# Patient Record
Sex: FEMALE | Race: WHITE | Employment: UNEMPLOYED | URBAN - METROPOLITAN AREA
[De-identification: names, ages, dates, MRNs, and addresses within clinical notes are randomized per-mention and may not be internally consistent; named-entity substitution may affect disease eponyms.]

---

## 2017-03-27 ENCOUNTER — APPOINTMENT (OUTPATIENT)
Dept: GENERAL RADIOLOGY | Age: 67
End: 2017-03-27
Payer: MEDICARE

## 2017-03-27 ENCOUNTER — APPOINTMENT (OUTPATIENT)
Dept: CT IMAGING | Age: 67
End: 2017-03-27
Payer: MEDICARE

## 2017-03-27 ENCOUNTER — APPOINTMENT (OUTPATIENT)
Dept: MRI IMAGING | Age: 67
End: 2017-03-27
Payer: MEDICARE

## 2017-03-27 ENCOUNTER — HOSPITAL ENCOUNTER (OUTPATIENT)
Age: 67
Setting detail: OBSERVATION
Discharge: HOME | End: 2017-03-29
Attending: EMERGENCY MEDICINE | Admitting: INTERNAL MEDICINE
Payer: MEDICARE

## 2017-03-27 DIAGNOSIS — G45.9 TRANSIENT CEREBRAL ISCHEMIA, UNSPECIFIED TYPE: Primary | ICD-10-CM

## 2017-03-27 LAB
ALBUMIN SERPL-MCNC: 4 G/DL (ref 3.9–4.9)
ALP BLD-CCNC: 66 U/L (ref 40–130)
ALT SERPL-CCNC: 15 U/L (ref 0–33)
AMPHETAMINE SCREEN, URINE: NORMAL
ANION GAP SERPL CALCULATED.3IONS-SCNC: 8 MEQ/L (ref 7–13)
APTT: 24.9 SEC (ref 21.6–35.4)
AST SERPL-CCNC: 16 U/L (ref 0–35)
BARBITURATE SCREEN URINE: NORMAL
BASOPHILS ABSOLUTE: 0.1 K/UL (ref 0–0.2)
BASOPHILS RELATIVE PERCENT: 1 %
BENZODIAZEPINE SCREEN, URINE: NORMAL
BILIRUB SERPL-MCNC: 0.8 MG/DL (ref 0–1.2)
BILIRUBIN URINE: NEGATIVE
BLOOD, URINE: NEGATIVE
BUN BLDV-MCNC: 13 MG/DL (ref 8–23)
CALCIUM SERPL-MCNC: 9.3 MG/DL (ref 8.6–10.2)
CANNABINOID SCREEN URINE: NORMAL
CHLORIDE BLD-SCNC: 102 MEQ/L (ref 98–107)
CLARITY: CLEAR
CO2: 26 MEQ/L (ref 22–29)
COCAINE METABOLITE SCREEN URINE: NORMAL
COLOR: YELLOW
CREAT SERPL-MCNC: 0.72 MG/DL (ref 0.5–0.9)
EOSINOPHILS ABSOLUTE: 0.5 K/UL (ref 0–0.7)
EOSINOPHILS RELATIVE PERCENT: 6.1 %
ETHANOL PERCENT: NORMAL G/DL
ETHANOL: <10 MG/DL (ref 0–0.08)
GFR AFRICAN AMERICAN: >60
GFR NON-AFRICAN AMERICAN: >60
GLOBULIN: 2 G/DL (ref 2.3–3.5)
GLUCOSE BLD-MCNC: 99 MG/DL (ref 74–109)
GLUCOSE URINE: NEGATIVE MG/DL
HCT VFR BLD CALC: 45 % (ref 37–47)
HEMOGLOBIN: 15.1 G/DL (ref 12–16)
INR BLD: 0.9
KETONES, URINE: NEGATIVE MG/DL
LEUKOCYTE ESTERASE, URINE: NEGATIVE
LYMPHOCYTES ABSOLUTE: 2 K/UL (ref 1–4.8)
LYMPHOCYTES RELATIVE PERCENT: 24.8 %
Lab: NORMAL
MCH RBC QN AUTO: 29.5 PG (ref 27–31.3)
MCHC RBC AUTO-ENTMCNC: 33.5 % (ref 33–37)
MCV RBC AUTO: 87.9 FL (ref 82–100)
MONOCYTES ABSOLUTE: 0.7 K/UL (ref 0.2–0.8)
MONOCYTES RELATIVE PERCENT: 8.8 %
NEUTROPHILS ABSOLUTE: 4.8 K/UL (ref 1.4–6.5)
NEUTROPHILS RELATIVE PERCENT: 59.3 %
NITRITE, URINE: NEGATIVE
OPIATE SCREEN URINE: NORMAL
PDW BLD-RTO: 14.1 % (ref 11.5–14.5)
PH UA: 7 (ref 5–9)
PHENCYCLIDINE SCREEN URINE: NORMAL
PLATELET # BLD: 184 K/UL (ref 130–400)
POTASSIUM SERPL-SCNC: 4.4 MEQ/L (ref 3.5–5.1)
PRO-BNP: 33 PG/ML
PROTEIN UA: NEGATIVE MG/DL
PROTHROMBIN TIME: 10.1 SEC (ref 8.1–13.7)
RBC # BLD: 5.12 M/UL (ref 4.2–5.4)
SODIUM BLD-SCNC: 136 MEQ/L (ref 132–144)
SPECIFIC GRAVITY UA: 1 (ref 1–1.03)
TOTAL PROTEIN: 6 G/DL (ref 6.4–8.1)
TROPONIN: <0.01 NG/ML (ref 0–0.01)
UROBILINOGEN, URINE: 0.2 E.U./DL
WBC # BLD: 8 K/UL (ref 4.8–10.8)

## 2017-03-27 PROCEDURE — 70450 CT HEAD/BRAIN W/O DYE: CPT

## 2017-03-27 PROCEDURE — 96372 THER/PROPH/DIAG INJ SC/IM: CPT

## 2017-03-27 PROCEDURE — 2580000003 HC RX 258: Performed by: PHYSICIAN ASSISTANT

## 2017-03-27 PROCEDURE — 85610 PROTHROMBIN TIME: CPT

## 2017-03-27 PROCEDURE — 71010 XR CHEST PORTABLE: CPT

## 2017-03-27 PROCEDURE — 85025 COMPLETE CBC W/AUTO DIFF WBC: CPT

## 2017-03-27 PROCEDURE — 80053 COMPREHEN METABOLIC PANEL: CPT

## 2017-03-27 PROCEDURE — 80307 DRUG TEST PRSMV CHEM ANLYZR: CPT

## 2017-03-27 PROCEDURE — 6370000000 HC RX 637 (ALT 250 FOR IP): Performed by: PHYSICIAN ASSISTANT

## 2017-03-27 PROCEDURE — 70544 MR ANGIOGRAPHY HEAD W/O DYE: CPT

## 2017-03-27 PROCEDURE — 36415 COLL VENOUS BLD VENIPUNCTURE: CPT

## 2017-03-27 PROCEDURE — 70551 MRI BRAIN STEM W/O DYE: CPT

## 2017-03-27 PROCEDURE — 84484 ASSAY OF TROPONIN QUANT: CPT

## 2017-03-27 PROCEDURE — 83880 ASSAY OF NATRIURETIC PEPTIDE: CPT

## 2017-03-27 PROCEDURE — 81003 URINALYSIS AUTO W/O SCOPE: CPT

## 2017-03-27 PROCEDURE — 6370000000 HC RX 637 (ALT 250 FOR IP): Performed by: EMERGENCY MEDICINE

## 2017-03-27 PROCEDURE — G0378 HOSPITAL OBSERVATION PER HR: HCPCS

## 2017-03-27 PROCEDURE — 85730 THROMBOPLASTIN TIME PARTIAL: CPT

## 2017-03-27 PROCEDURE — G0480 DRUG TEST DEF 1-7 CLASSES: HCPCS

## 2017-03-27 PROCEDURE — 99285 EMERGENCY DEPT VISIT HI MDM: CPT

## 2017-03-27 PROCEDURE — 93005 ELECTROCARDIOGRAM TRACING: CPT

## 2017-03-27 PROCEDURE — 6360000002 HC RX W HCPCS: Performed by: PHYSICIAN ASSISTANT

## 2017-03-27 RX ORDER — ONDANSETRON 2 MG/ML
4 INJECTION INTRAMUSCULAR; INTRAVENOUS EVERY 6 HOURS PRN
Status: DISCONTINUED | OUTPATIENT
Start: 2017-03-27 | End: 2017-03-29 | Stop reason: HOSPADM

## 2017-03-27 RX ORDER — ATORVASTATIN CALCIUM 20 MG/1
20 TABLET, FILM COATED ORAL NIGHTLY
Status: DISCONTINUED | OUTPATIENT
Start: 2017-03-27 | End: 2017-03-29 | Stop reason: HOSPADM

## 2017-03-27 RX ORDER — ASPIRIN 325 MG
325 TABLET ORAL ONCE
Status: COMPLETED | OUTPATIENT
Start: 2017-03-27 | End: 2017-03-27

## 2017-03-27 RX ORDER — LORAZEPAM 2 MG/ML
2 INJECTION INTRAMUSCULAR ONCE
Status: DISCONTINUED | OUTPATIENT
Start: 2017-03-27 | End: 2017-03-29 | Stop reason: HOSPADM

## 2017-03-27 RX ORDER — SODIUM CHLORIDE 0.9 % (FLUSH) 0.9 %
10 SYRINGE (ML) INJECTION PRN
Status: DISCONTINUED | OUTPATIENT
Start: 2017-03-27 | End: 2017-03-29 | Stop reason: HOSPADM

## 2017-03-27 RX ORDER — MELOXICAM 7.5 MG/1
7.5 TABLET ORAL DAILY
Status: DISCONTINUED | OUTPATIENT
Start: 2017-03-27 | End: 2017-03-29 | Stop reason: HOSPADM

## 2017-03-27 RX ORDER — SODIUM CHLORIDE 0.9 % (FLUSH) 0.9 %
10 SYRINGE (ML) INJECTION EVERY 12 HOURS SCHEDULED
Status: DISCONTINUED | OUTPATIENT
Start: 2017-03-27 | End: 2017-03-29 | Stop reason: HOSPADM

## 2017-03-27 RX ORDER — ASPIRIN 81 MG/1
81 TABLET ORAL DAILY
Status: DISCONTINUED | OUTPATIENT
Start: 2017-03-27 | End: 2017-03-29 | Stop reason: HOSPADM

## 2017-03-27 RX ORDER — ACETAMINOPHEN 325 MG/1
650 TABLET ORAL EVERY 4 HOURS PRN
Status: DISCONTINUED | OUTPATIENT
Start: 2017-03-27 | End: 2017-03-29 | Stop reason: HOSPADM

## 2017-03-27 RX ORDER — CITALOPRAM 20 MG/1
40 TABLET ORAL DAILY
Status: DISCONTINUED | OUTPATIENT
Start: 2017-03-27 | End: 2017-03-29 | Stop reason: HOSPADM

## 2017-03-27 RX ORDER — HYDROCODONE BITARTRATE AND ACETAMINOPHEN 5; 325 MG/1; MG/1
1 TABLET ORAL EVERY 6 HOURS PRN
Status: DISCONTINUED | OUTPATIENT
Start: 2017-03-27 | End: 2017-03-29 | Stop reason: HOSPADM

## 2017-03-27 RX ADMIN — ENOXAPARIN SODIUM 40 MG: 100 INJECTION SUBCUTANEOUS at 21:27

## 2017-03-27 RX ADMIN — ASPIRIN 325 MG ORAL TABLET 325 MG: 325 PILL ORAL at 14:27

## 2017-03-27 RX ADMIN — SODIUM CHLORIDE, PRESERVATIVE FREE 10 ML: 5 INJECTION INTRAVENOUS at 21:27

## 2017-03-27 RX ADMIN — ATORVASTATIN CALCIUM 20 MG: 20 TABLET, FILM COATED ORAL at 21:27

## 2017-03-27 ASSESSMENT — ENCOUNTER SYMPTOMS
EYE DISCHARGE: 0
RHINORRHEA: 0
PHOTOPHOBIA: 0
WHEEZING: 0
SHORTNESS OF BREATH: 0
ABDOMINAL PAIN: 0
VOMITING: 0
COLOR CHANGE: 0
ABDOMINAL DISTENTION: 0
FACIAL SWELLING: 0

## 2017-03-28 ENCOUNTER — APPOINTMENT (OUTPATIENT)
Dept: ULTRASOUND IMAGING | Age: 67
End: 2017-03-28
Payer: MEDICARE

## 2017-03-28 LAB
ANION GAP SERPL CALCULATED.3IONS-SCNC: 11 MEQ/L (ref 7–13)
BASOPHILS ABSOLUTE: 0.1 K/UL (ref 0–0.2)
BASOPHILS RELATIVE PERCENT: 0.9 %
BUN BLDV-MCNC: 12 MG/DL (ref 8–23)
CALCIUM SERPL-MCNC: 8.7 MG/DL (ref 8.6–10.2)
CHLORIDE BLD-SCNC: 102 MEQ/L (ref 98–107)
CHOLESTEROL, TOTAL: 177 MG/DL (ref 0–199)
CO2: 26 MEQ/L (ref 22–29)
CREAT SERPL-MCNC: 0.68 MG/DL (ref 0.5–0.9)
EOSINOPHILS ABSOLUTE: 0.6 K/UL (ref 0–0.7)
EOSINOPHILS RELATIVE PERCENT: 9 %
GFR AFRICAN AMERICAN: >60
GFR NON-AFRICAN AMERICAN: >60
GLUCOSE BLD-MCNC: 125 MG/DL (ref 74–109)
HCT VFR BLD CALC: 42.8 % (ref 37–47)
HDLC SERPL-MCNC: 54 MG/DL (ref 40–59)
HEMOGLOBIN: 14.6 G/DL (ref 12–16)
LDL CHOLESTEROL CALCULATED: 101 MG/DL (ref 0–129)
LV EF: 50 %
LVEF MODALITY: NORMAL
LYMPHOCYTES ABSOLUTE: 2 K/UL (ref 1–4.8)
LYMPHOCYTES RELATIVE PERCENT: 32 %
MCH RBC QN AUTO: 30.1 PG (ref 27–31.3)
MCHC RBC AUTO-ENTMCNC: 34.1 % (ref 33–37)
MCV RBC AUTO: 88.1 FL (ref 82–100)
MONOCYTES ABSOLUTE: 0.7 K/UL (ref 0.2–0.8)
MONOCYTES RELATIVE PERCENT: 11.3 %
NEUTROPHILS ABSOLUTE: 2.9 K/UL (ref 1.4–6.5)
NEUTROPHILS RELATIVE PERCENT: 46.8 %
PDW BLD-RTO: 14.3 % (ref 11.5–14.5)
PLATELET # BLD: 170 K/UL (ref 130–400)
POTASSIUM SERPL-SCNC: 4.5 MEQ/L (ref 3.5–5.1)
RBC # BLD: 4.86 M/UL (ref 4.2–5.4)
SODIUM BLD-SCNC: 139 MEQ/L (ref 132–144)
TRIGL SERPL-MCNC: 110 MG/DL (ref 0–200)
WBC # BLD: 6.3 K/UL (ref 4.8–10.8)

## 2017-03-28 PROCEDURE — 80048 BASIC METABOLIC PNL TOTAL CA: CPT

## 2017-03-28 PROCEDURE — 2580000003 HC RX 258: Performed by: PHYSICIAN ASSISTANT

## 2017-03-28 PROCEDURE — 93307 TTE W/O DOPPLER COMPLETE: CPT

## 2017-03-28 PROCEDURE — 6370000000 HC RX 637 (ALT 250 FOR IP): Performed by: INTERNAL MEDICINE

## 2017-03-28 PROCEDURE — 6370000000 HC RX 637 (ALT 250 FOR IP): Performed by: PHYSICIAN ASSISTANT

## 2017-03-28 PROCEDURE — 96372 THER/PROPH/DIAG INJ SC/IM: CPT

## 2017-03-28 PROCEDURE — 85025 COMPLETE CBC W/AUTO DIFF WBC: CPT

## 2017-03-28 PROCEDURE — 6370000000 HC RX 637 (ALT 250 FOR IP): Performed by: PSYCHIATRY & NEUROLOGY

## 2017-03-28 PROCEDURE — 36415 COLL VENOUS BLD VENIPUNCTURE: CPT

## 2017-03-28 PROCEDURE — 93880 EXTRACRANIAL BILAT STUDY: CPT

## 2017-03-28 PROCEDURE — G0378 HOSPITAL OBSERVATION PER HR: HCPCS

## 2017-03-28 PROCEDURE — 80061 LIPID PANEL: CPT

## 2017-03-28 PROCEDURE — 95816 EEG AWAKE AND DROWSY: CPT

## 2017-03-28 PROCEDURE — 6360000002 HC RX W HCPCS: Performed by: PHYSICIAN ASSISTANT

## 2017-03-28 RX ORDER — OXCARBAZEPINE 300 MG/1
300 TABLET, FILM COATED ORAL 2 TIMES DAILY
Status: DISCONTINUED | OUTPATIENT
Start: 2017-03-28 | End: 2017-03-29 | Stop reason: HOSPADM

## 2017-03-28 RX ADMIN — CITALOPRAM HYDROBROMIDE 40 MG: 20 TABLET ORAL at 08:30

## 2017-03-28 RX ADMIN — SODIUM CHLORIDE, PRESERVATIVE FREE 10 ML: 5 INJECTION INTRAVENOUS at 08:34

## 2017-03-28 RX ADMIN — ENOXAPARIN SODIUM 40 MG: 100 INJECTION SUBCUTANEOUS at 08:30

## 2017-03-28 RX ADMIN — OXCARBAZEPINE 300 MG: 300 TABLET, FILM COATED ORAL at 11:30

## 2017-03-28 RX ADMIN — OXCARBAZEPINE 300 MG: 300 TABLET, FILM COATED ORAL at 20:48

## 2017-03-28 RX ADMIN — ASPIRIN 81 MG: 81 TABLET, COATED ORAL at 08:30

## 2017-03-28 RX ADMIN — MELOXICAM 7.5 MG: 7.5 TABLET ORAL at 08:30

## 2017-03-28 RX ADMIN — SODIUM CHLORIDE, PRESERVATIVE FREE 10 ML: 5 INJECTION INTRAVENOUS at 20:49

## 2017-03-28 RX ADMIN — ATORVASTATIN CALCIUM 20 MG: 20 TABLET, FILM COATED ORAL at 20:48

## 2017-03-28 ASSESSMENT — ENCOUNTER SYMPTOMS
SHORTNESS OF BREATH: 0
VOMITING: 0

## 2017-03-28 ASSESSMENT — PAIN SCALES - GENERAL
PAINLEVEL_OUTOF10: 0
PAINLEVEL_OUTOF10: 2

## 2017-03-29 VITALS
WEIGHT: 278 LBS | DIASTOLIC BLOOD PRESSURE: 82 MMHG | TEMPERATURE: 97.5 F | SYSTOLIC BLOOD PRESSURE: 145 MMHG | HEIGHT: 64 IN | RESPIRATION RATE: 18 BRPM | BODY MASS INDEX: 47.46 KG/M2 | OXYGEN SATURATION: 95 % | HEART RATE: 67 BPM

## 2017-03-29 PROBLEM — G45.9 TIA (TRANSIENT ISCHEMIC ATTACK): Status: ACTIVE | Noted: 2017-03-29

## 2017-03-29 PROCEDURE — 6370000000 HC RX 637 (ALT 250 FOR IP): Performed by: PSYCHIATRY & NEUROLOGY

## 2017-03-29 PROCEDURE — 2580000003 HC RX 258: Performed by: PHYSICIAN ASSISTANT

## 2017-03-29 PROCEDURE — 6370000000 HC RX 637 (ALT 250 FOR IP): Performed by: INTERNAL MEDICINE

## 2017-03-29 PROCEDURE — G0378 HOSPITAL OBSERVATION PER HR: HCPCS

## 2017-03-29 PROCEDURE — 6370000000 HC RX 637 (ALT 250 FOR IP): Performed by: PHYSICIAN ASSISTANT

## 2017-03-29 RX ORDER — OXCARBAZEPINE 300 MG/1
300 TABLET, FILM COATED ORAL 2 TIMES DAILY
Qty: 60 TABLET | Refills: 3 | Status: SHIPPED | OUTPATIENT
Start: 2017-03-29 | End: 2020-12-18

## 2017-03-29 RX ORDER — ASPIRIN 81 MG/1
81 TABLET ORAL DAILY
Qty: 30 TABLET | Refills: 3 | Status: SHIPPED | OUTPATIENT
Start: 2017-03-29 | End: 2020-12-18

## 2017-03-29 RX ORDER — ATORVASTATIN CALCIUM 20 MG/1
20 TABLET, FILM COATED ORAL NIGHTLY
Qty: 30 TABLET | Refills: 3 | Status: SHIPPED | OUTPATIENT
Start: 2017-03-29 | End: 2020-12-18

## 2017-03-29 RX ADMIN — MELOXICAM 7.5 MG: 7.5 TABLET ORAL at 09:04

## 2017-03-29 RX ADMIN — OXCARBAZEPINE 300 MG: 300 TABLET, FILM COATED ORAL at 09:05

## 2017-03-29 RX ADMIN — ASPIRIN 81 MG: 81 TABLET, COATED ORAL at 09:05

## 2017-03-29 RX ADMIN — CITALOPRAM HYDROBROMIDE 40 MG: 20 TABLET ORAL at 09:05

## 2017-03-29 RX ADMIN — SODIUM CHLORIDE, PRESERVATIVE FREE 10 ML: 5 INJECTION INTRAVENOUS at 09:07

## 2017-03-29 ASSESSMENT — PAIN SCALES - GENERAL: PAINLEVEL_OUTOF10: 4

## 2017-03-30 LAB
EKG ATRIAL RATE: 71 BPM
EKG P AXIS: 37 DEGREES
EKG P-R INTERVAL: 154 MS
EKG Q-T INTERVAL: 384 MS
EKG QRS DURATION: 88 MS
EKG QTC CALCULATION (BAZETT): 417 MS
EKG R AXIS: 51 DEGREES
EKG T AXIS: 44 DEGREES
EKG VENTRICULAR RATE: 71 BPM

## 2019-06-25 ENCOUNTER — HOSPITAL ENCOUNTER (OUTPATIENT)
Dept: NEUROLOGY | Age: 69
Discharge: HOME | End: 2019-06-25
Payer: MEDICARE

## 2019-06-25 LAB
ALBUMIN SERPL-MCNC: 4.2 G/DL (ref 3.5–4.6)
ALP BLD-CCNC: 66 U/L (ref 40–130)
ALT SERPL-CCNC: 16 U/L (ref 0–33)
ANION GAP SERPL CALCULATED.3IONS-SCNC: 11 MEQ/L (ref 9–15)
AST SERPL-CCNC: 15 U/L (ref 0–35)
BASOPHILS ABSOLUTE: 0.1 K/UL (ref 0–0.2)
BASOPHILS RELATIVE PERCENT: 1.6 %
BILIRUB SERPL-MCNC: 0.5 MG/DL (ref 0.2–0.7)
BILIRUBIN DIRECT: <0.2 MG/DL (ref 0–0.4)
BILIRUBIN, INDIRECT: ABNORMAL MG/DL (ref 0–0.6)
BUN BLDV-MCNC: 14 MG/DL (ref 8–23)
CALCIUM SERPL-MCNC: 9.5 MG/DL (ref 8.5–9.9)
CHLORIDE BLD-SCNC: 102 MEQ/L (ref 95–107)
CO2: 28 MEQ/L (ref 20–31)
CREAT SERPL-MCNC: 0.73 MG/DL (ref 0.5–0.9)
EOSINOPHILS ABSOLUTE: 0.6 K/UL (ref 0–0.7)
EOSINOPHILS RELATIVE PERCENT: 6.8 %
GFR AFRICAN AMERICAN: >60
GFR NON-AFRICAN AMERICAN: >60
GLUCOSE BLD-MCNC: 77 MG/DL (ref 70–99)
HCT VFR BLD CALC: 40.9 % (ref 37–47)
HEMOGLOBIN: 13.7 G/DL (ref 12–16)
LYMPHOCYTES ABSOLUTE: 2.4 K/UL (ref 1–4.8)
LYMPHOCYTES RELATIVE PERCENT: 29.4 %
MCH RBC QN AUTO: 30.2 PG (ref 27–31.3)
MCHC RBC AUTO-ENTMCNC: 33.6 % (ref 33–37)
MCV RBC AUTO: 89.9 FL (ref 82–100)
MONOCYTES ABSOLUTE: 0.7 K/UL (ref 0.2–0.8)
MONOCYTES RELATIVE PERCENT: 9 %
NEUTROPHILS ABSOLUTE: 4.4 K/UL (ref 1.4–6.5)
NEUTROPHILS RELATIVE PERCENT: 53.2 %
PDW BLD-RTO: 13.9 % (ref 11.5–14.5)
PLATELET # BLD: 222 K/UL (ref 130–400)
POTASSIUM SERPL-SCNC: 4.8 MEQ/L (ref 3.4–4.9)
RBC # BLD: 4.55 M/UL (ref 4.2–5.4)
SODIUM BLD-SCNC: 141 MEQ/L (ref 135–144)
TOTAL PROTEIN: 6.2 G/DL (ref 6.3–8)
WBC # BLD: 8.3 K/UL (ref 4.8–10.8)

## 2019-06-25 PROCEDURE — 95816 EEG AWAKE AND DROWSY: CPT

## 2019-06-26 NOTE — PROCEDURES
[address]                      [facility], [address] [facility]                          ELECTROENCEPHALOGRAM REPORT    PATIENT NAME: Poonam Omer                   :        1950  MED REC NO:   [id number]                            ROOM:  ACCOUNT NO:   [de-identified]                           ADMIT DATE: 2019  PROVIDER:     Gregorio Santos MD    DATE OF EE2019    MEDICATIONS:  Aspirin, Lipitor, Trileptal, Norco, Celexa, Mobic,  Zanaflex. This is a spontaneous 21-channel EEG recording for this patient with a  history of question of seizure with TIA. The background rhythm of this  EEG shows a 9 Hz posterior dominant rhythm of alpha. This is  symmetrical and attenuates with eye opening. EKG is monitored, this is  regular. Hyperventilation is not performed due to asthma flare up. Photic stimulation is completed without any photic responses. The  record remains in awake phase with well-regulated alpha rhythms without  any asymmetries. IMPRESSION:  This is a normal well-regulated EEG recording. Clinical  correlation is recommended.       Jose Jewell MD    D: 2019 12:27:39       T: 2019 14:01:38     [email or web address]  Job#: [id number]     Doc#: [id number]    CC:

## 2020-12-18 ENCOUNTER — HOSPITAL ENCOUNTER (EMERGENCY)
Age: 70
Discharge: HOME | End: 2020-12-18
Attending: EMERGENCY MEDICINE
Payer: MEDICARE

## 2020-12-18 ENCOUNTER — APPOINTMENT (OUTPATIENT)
Dept: GENERAL RADIOLOGY | Age: 70
End: 2020-12-18
Payer: MEDICARE

## 2020-12-18 ENCOUNTER — APPOINTMENT (OUTPATIENT)
Dept: CT IMAGING | Age: 70
End: 2020-12-18
Payer: MEDICARE

## 2020-12-18 VITALS
HEART RATE: 73 BPM | SYSTOLIC BLOOD PRESSURE: 120 MMHG | DIASTOLIC BLOOD PRESSURE: 74 MMHG | OXYGEN SATURATION: 97 % | TEMPERATURE: 98.5 F | WEIGHT: 273 LBS | BODY MASS INDEX: 48.37 KG/M2 | RESPIRATION RATE: 15 BRPM | HEIGHT: 63 IN

## 2020-12-18 LAB
ALBUMIN SERPL-MCNC: 4.4 G/DL (ref 3.5–4.6)
ALP BLD-CCNC: 72 U/L (ref 40–130)
ALT SERPL-CCNC: 14 U/L (ref 0–33)
AMPHETAMINE SCREEN, URINE: NORMAL
ANION GAP SERPL CALCULATED.3IONS-SCNC: 10 MEQ/L (ref 9–15)
AST SERPL-CCNC: 19 U/L (ref 0–35)
BARBITURATE SCREEN URINE: NORMAL
BASOPHILS ABSOLUTE: 0.1 K/UL (ref 0–0.2)
BASOPHILS RELATIVE PERCENT: 1.4 %
BENZODIAZEPINE SCREEN, URINE: NORMAL
BILIRUB SERPL-MCNC: 0.5 MG/DL (ref 0.2–0.7)
BILIRUBIN URINE: NEGATIVE
BLOOD, URINE: NEGATIVE
BUN BLDV-MCNC: 13 MG/DL (ref 8–23)
CALCIUM SERPL-MCNC: 9.3 MG/DL (ref 8.5–9.9)
CANNABINOID SCREEN URINE: NORMAL
CHLORIDE BLD-SCNC: 101 MEQ/L (ref 95–107)
CLARITY: CLEAR
CO2: 31 MEQ/L (ref 20–31)
COCAINE METABOLITE SCREEN URINE: NORMAL
COLOR: YELLOW
CREAT SERPL-MCNC: 0.84 MG/DL (ref 0.5–0.9)
EKG ATRIAL RATE: 111 BPM
EKG Q-T INTERVAL: 412 MS
EKG QRS DURATION: 86 MS
EKG QTC CALCULATION (BAZETT): 478 MS
EKG R AXIS: 44 DEGREES
EKG T AXIS: 9 DEGREES
EKG VENTRICULAR RATE: 81 BPM
EOSINOPHILS ABSOLUTE: 1 K/UL (ref 0–0.7)
EOSINOPHILS RELATIVE PERCENT: 9.5 %
ETHANOL PERCENT: NORMAL G/DL
ETHANOL: <10 MG/DL (ref 0–0.08)
GFR AFRICAN AMERICAN: >60
GFR NON-AFRICAN AMERICAN: >60
GLOBULIN: 2.3 G/DL (ref 2.3–3.5)
GLUCOSE BLD-MCNC: 113 MG/DL (ref 70–99)
GLUCOSE URINE: NEGATIVE MG/DL
HCT VFR BLD CALC: 48.9 % (ref 37–47)
HEMOGLOBIN: 16.5 G/DL (ref 12–16)
KETONES, URINE: NEGATIVE MG/DL
LACTIC ACID: 1.7 MMOL/L (ref 0.5–2.2)
LEUKOCYTE ESTERASE, URINE: NEGATIVE
LYMPHOCYTES ABSOLUTE: 3.6 K/UL (ref 1–4.8)
LYMPHOCYTES RELATIVE PERCENT: 34.9 %
Lab: NORMAL
MAGNESIUM: 2 MG/DL (ref 1.7–2.4)
MCH RBC QN AUTO: 30.3 PG (ref 27–31.3)
MCHC RBC AUTO-ENTMCNC: 33.7 % (ref 33–37)
MCV RBC AUTO: 90 FL (ref 82–100)
METHADONE SCREEN, URINE: NORMAL
MONOCYTES ABSOLUTE: 0.9 K/UL (ref 0.2–0.8)
MONOCYTES RELATIVE PERCENT: 9.2 %
NEUTROPHILS ABSOLUTE: 4.7 K/UL (ref 1.4–6.5)
NEUTROPHILS RELATIVE PERCENT: 45 %
NITRITE, URINE: NEGATIVE
OPIATE SCREEN URINE: NORMAL
OXYCODONE URINE: NORMAL
PDW BLD-RTO: 13.9 % (ref 11.5–14.5)
PH UA: 7.5 (ref 5–9)
PHENCYCLIDINE SCREEN URINE: NORMAL
PLATELET # BLD: ABNORMAL K/UL (ref 130–400)
PLATELET SLIDE REVIEW: ADEQUATE
POTASSIUM SERPL-SCNC: 3.8 MEQ/L (ref 3.4–4.9)
PROLACTIN: 56.4 NG/ML
PROPOXYPHENE SCREEN: NORMAL
PROTEIN UA: NEGATIVE MG/DL
RBC # BLD: 5.43 M/UL (ref 4.2–5.4)
SODIUM BLD-SCNC: 142 MEQ/L (ref 135–144)
SPECIFIC GRAVITY UA: 1.01 (ref 1–1.03)
TOTAL PROTEIN: 6.7 G/DL (ref 6.3–8)
TROPONIN: <0.01 NG/ML (ref 0–0.01)
UROBILINOGEN, URINE: 0.2 E.U./DL
WBC # BLD: 10.3 K/UL (ref 4.8–10.8)

## 2020-12-18 PROCEDURE — 85025 COMPLETE CBC W/AUTO DIFF WBC: CPT

## 2020-12-18 PROCEDURE — 84484 ASSAY OF TROPONIN QUANT: CPT

## 2020-12-18 PROCEDURE — 36415 COLL VENOUS BLD VENIPUNCTURE: CPT

## 2020-12-18 PROCEDURE — 99285 EMERGENCY DEPT VISIT HI MDM: CPT

## 2020-12-18 PROCEDURE — 83735 ASSAY OF MAGNESIUM: CPT

## 2020-12-18 PROCEDURE — 93010 ELECTROCARDIOGRAM REPORT: CPT | Performed by: INTERNAL MEDICINE

## 2020-12-18 PROCEDURE — 93005 ELECTROCARDIOGRAM TRACING: CPT | Performed by: EMERGENCY MEDICINE

## 2020-12-18 PROCEDURE — 2580000003 HC RX 258: Performed by: EMERGENCY MEDICINE

## 2020-12-18 PROCEDURE — 83605 ASSAY OF LACTIC ACID: CPT

## 2020-12-18 PROCEDURE — 80307 DRUG TEST PRSMV CHEM ANLYZR: CPT

## 2020-12-18 PROCEDURE — 80053 COMPREHEN METABOLIC PANEL: CPT

## 2020-12-18 PROCEDURE — 70450 CT HEAD/BRAIN W/O DYE: CPT

## 2020-12-18 PROCEDURE — 84146 ASSAY OF PROLACTIN: CPT

## 2020-12-18 PROCEDURE — 81003 URINALYSIS AUTO W/O SCOPE: CPT

## 2020-12-18 PROCEDURE — G0480 DRUG TEST DEF 1-7 CLASSES: HCPCS

## 2020-12-18 PROCEDURE — 71045 X-RAY EXAM CHEST 1 VIEW: CPT

## 2020-12-18 RX ORDER — OXCARBAZEPINE 150 MG/1
150 TABLET, FILM COATED ORAL 2 TIMES DAILY
Qty: 60 TABLET | Refills: 3 | Status: SHIPPED | OUTPATIENT
Start: 2020-12-18 | End: 2021-06-21

## 2020-12-18 RX ORDER — FUROSEMIDE 40 MG/1
40 TABLET ORAL DAILY
COMMUNITY

## 2020-12-18 RX ORDER — SOTALOL HYDROCHLORIDE 120 MG/1
240 TABLET ORAL 2 TIMES DAILY
COMMUNITY
End: 2021-11-12

## 2020-12-18 RX ORDER — 0.9 % SODIUM CHLORIDE 0.9 %
1000 INTRAVENOUS SOLUTION INTRAVENOUS ONCE
Status: COMPLETED | OUTPATIENT
Start: 2020-12-18 | End: 2020-12-18

## 2020-12-18 RX ADMIN — SODIUM CHLORIDE 1000 ML: 9 INJECTION, SOLUTION INTRAVENOUS at 07:51

## 2020-12-18 ASSESSMENT — ENCOUNTER SYMPTOMS
SORE THROAT: 0
NAUSEA: 0
BACK PAIN: 0
VOMITING: 0
DIARRHEA: 0
COUGH: 0
SHORTNESS OF BREATH: 0
ABDOMINAL PAIN: 0

## 2020-12-18 NOTE — ED NOTES
Bed: 06  Expected date: 12/18/20  Expected time: 7:05 AM  Means of arrival: [facility]  Comments:  65 YOF disorientated per family.  IRA Fung RN  12/18/20 0042

## 2020-12-18 NOTE — ED NOTES
Bloods collected and sent to lab w/pt labels. Xray at bedside for portable cxr.       Sirisha Noyola RN  12/18/20 0606

## 2020-12-18 NOTE — ED PROVIDER NOTES
[address] [facility] ED  eMERGENCYdEPARTMENT eNCOUnter      Pt Name: Humberto Posey  MRN: [id number]  [place] 1950  Date of evaluation: 12/18/2020  Jose Tejada MD    CHIEF COMPLAINT           HPI  Humberto Posey is a 65 y.o. female per chart review has a h/o depression, afib on eliquis, HTN, seizures presents to the ED with AMS. Per family, pt was sitting at the computer and was unresponsive for about 5-10 and staring into space per family. Pt does not remember the event. Pt notes she is supposed to take trilepta however does not take it. Pt denies fever, headache, n/v, cp, sob, dysuria, diarrhea. ROS  Review of Systems   Constitutional: Negative for activity change, chills and fever. HENT: Negative for ear pain and sore throat. Eyes: Negative for visual disturbance. Respiratory: Negative for cough and shortness of breath. Cardiovascular: Negative for chest pain, palpitations and leg swelling. Gastrointestinal: Negative for abdominal pain, diarrhea, nausea and vomiting. Genitourinary: Negative for dysuria. Musculoskeletal: Negative for back pain. Skin: Negative for rash. Neurological: Positive for syncope. Negative for dizziness and weakness. Except as noted above the remainder of the review of systems was reviewed and negative.        PAST MEDICAL HISTORY     Past Medical History:   Diagnosis Date    Depression     Hypertension          SURGICAL HISTORY       Past Surgical History:   Procedure Laterality Date    JOINT REPLACEMENT      right hip    JOINT REPLACEMENT      right knee         CURRENTMEDICATIONS       Previous Medications    APIXABAN (ELIQUIS) 5 MG TABS TABLET    Take 5 mg by mouth 2 times daily Is to restart today    CITALOPRAM (CELEXA) 40 MG TABLET    10 mg Is to start again in 2 weeks from today    FUROSEMIDE (LASIX) 40 MG TABLET    Take 40 mg by mouth daily    SOTALOL (BETAPACE) 120 MG TABLET    Take 240 mg by mouth 2 times daily normal.   Eyes:      Conjunctiva/sclera: Conjunctivae normal.      Pupils: Pupils are equal, round, and reactive to light. Neck:      Musculoskeletal: Normal range of motion and neck supple. Cardiovascular:      Rate and Rhythm: Normal rate and regular rhythm. Heart sounds: Normal heart sounds. Pulmonary:      Effort: Pulmonary effort is normal.      Breath sounds: Normal breath sounds. Abdominal:      General: Bowel sounds are normal. There is no distension. Palpations: Abdomen is soft. Tenderness: There is no abdominal tenderness. Musculoskeletal: Normal range of motion. Skin:     General: Skin is warm and dry. Neurological:      Mental Status: She is alert and oriented to person, place, and time. Psychiatric:         Mood and Affect: Mood normal.           MDM  64 yo female presents to the ED with AMS not resolved. Pt is afebrile, hemodynamically stable. Pt given 1 L NS in the ED. EKG shows NSR with HR 81, normal axis, normal intervals, no ST changes. Labs unremarkable. UA, tox negative. CT head, CXR negative. Pt states that she used to take trilepta for focal/absence seizures however pt no longer takes it. She stopped herself as she felt that she didn't have any seizures. Pt educated about seizures and importance of taking her meds. Pt follows with Dr. Morgan Fajardo. Pt given prescription for trilepta and will f/u with neurology. Pt understands plan. FINAL IMPRESSION      1.  Seizure [facility])          DISPOSITION/PLAN   DISPOSITION Decision To Discharge 12/18/2020 08:35:11 AM        DISCHARGE MEDICATIONS:  [unfilled]         Cl Wen MD(electronically signed)  Attending Emergency Physician            Cl Wen MD  12/18/20 [id number]

## 2020-12-21 ENCOUNTER — OFFICE VISIT (OUTPATIENT)
Dept: NEUROLOGY | Age: 70
End: 2020-12-21
Payer: MEDICARE

## 2020-12-21 VITALS
SYSTOLIC BLOOD PRESSURE: 136 MMHG | TEMPERATURE: 96.8 F | DIASTOLIC BLOOD PRESSURE: 76 MMHG | BODY MASS INDEX: 48.71 KG/M2 | WEIGHT: 275 LBS | HEART RATE: 56 BPM

## 2020-12-21 PROBLEM — R56.9 PARTIAL SEIZURES (HCC): Status: ACTIVE | Noted: 2020-12-21

## 2020-12-21 PROCEDURE — G8484 FLU IMMUNIZE NO ADMIN: HCPCS | Performed by: PSYCHIATRY & NEUROLOGY

## 2020-12-21 PROCEDURE — 1123F ACP DISCUSS/DSCN MKR DOCD: CPT | Performed by: PSYCHIATRY & NEUROLOGY

## 2020-12-21 PROCEDURE — 1036F TOBACCO NON-USER: CPT | Performed by: PSYCHIATRY & NEUROLOGY

## 2020-12-21 PROCEDURE — 3017F COLORECTAL CA SCREEN DOC REV: CPT | Performed by: PSYCHIATRY & NEUROLOGY

## 2020-12-21 PROCEDURE — 1090F PRES/ABSN URINE INCON ASSESS: CPT | Performed by: PSYCHIATRY & NEUROLOGY

## 2020-12-21 PROCEDURE — G8427 DOCREV CUR MEDS BY ELIG CLIN: HCPCS | Performed by: PSYCHIATRY & NEUROLOGY

## 2020-12-21 PROCEDURE — 99215 OFFICE O/P EST HI 40 MIN: CPT | Performed by: PSYCHIATRY & NEUROLOGY

## 2020-12-21 PROCEDURE — G8417 CALC BMI ABV UP PARAM F/U: HCPCS | Performed by: PSYCHIATRY & NEUROLOGY

## 2020-12-21 PROCEDURE — 4040F PNEUMOC VAC/ADMIN/RCVD: CPT | Performed by: PSYCHIATRY & NEUROLOGY

## 2020-12-21 PROCEDURE — G8400 PT W/DXA NO RESULTS DOC: HCPCS | Performed by: PSYCHIATRY & NEUROLOGY

## 2020-12-21 RX ORDER — OXCARBAZEPINE 300 MG/1
300 TABLET, FILM COATED ORAL 2 TIMES DAILY
Qty: 60 TABLET | Refills: 3 | Status: SHIPPED | OUTPATIENT
Start: 2020-12-21 | End: 2021-06-21

## 2020-12-21 ASSESSMENT — ENCOUNTER SYMPTOMS
BACK PAIN: 0
COLOR CHANGE: 0
PHOTOPHOBIA: 0
NAUSEA: 0
TROUBLE SWALLOWING: 0
VOMITING: 0
CHOKING: 0
SHORTNESS OF BREATH: 0

## 2020-12-21 NOTE — PROGRESS NOTES
Subjective:      Patient ID: Vivian Michel is a 65 y.o. female who presents today for:  Chief Complaint   Patient presents with    Follow-up     Patient states things have been going okay. She says that she had an ER visit on friday. Her  said that he eyes were rolled in the back of her head, the right side of her mouth was drooping, she was told that she had a seizure. She states that she hasen't had one of them in a very long time. Has not had another since, she says that she doesn't have much memory of ehat happened only what she was told, and she still feels kind of fuzzy since it happened. HPI 25-year-old right-handed female with history of complex partial seizures. We have not seen her for 3 years. Patient was on Trileptal 150 mg times a day as we were not able to sort out whether this was a TIA or partial seizure. We recommended continued medication and she is on apixaban for cardiac reasons as well. She was seen in the emergency room she had a spell about 7 to 10 minutes with staring spell and this was new for her. She had discontinued the Trileptal on her own thinking that she did not have seizures. Patient appears to be somewhat foggy since her seizure. Patient had no recall of the events. Past medical records which were extensively reviewed.   She is on Eliquis for atrial fibrillation    Past Medical History:   Diagnosis Date    Depression     Hypertension      Past Surgical History:   Procedure Laterality Date    JOINT REPLACEMENT      right hip    JOINT REPLACEMENT      right knee     Social History     Socioeconomic History    Marital status:      Spouse name: Not on file    Number of children: Not on file    Years of education: Not on file    Highest education level: Not on file   Occupational History    Not on file   Social Needs    Financial resource strain: Not on file    Food insecurity     Worry: Not on file     Inability: Not on file  Transportation needs     Medical: Not on file     Non-medical: Not on file   Tobacco Use    Smoking status: Former Smoker     Start date: 6/7/1968    Smokeless tobacco: Never Used   Substance and Sexual Activity    Alcohol use:  Yes     Alcohol/week: 6.0 standard drinks     Types: 6 Glasses of wine per week    Drug use: No    Sexual activity: Not on file   Lifestyle    Physical activity     Days per week: Not on file     Minutes per session: Not on file    Stress: Not on file   Relationships    Social connections     Talks on phone: Not on file     Gets together: Not on file     Attends Buddhism service: Not on file     Active member of club or organization: Not on file     Attends meetings of clubs or organizations: Not on file     Relationship status: Not on file    Intimate partner violence     Fear of current or ex partner: Not on file     Emotionally abused: Not on file     Physically abused: Not on file     Forced sexual activity: Not on file   Other Topics Concern    Not on file   Social History Narrative    Not on file     Family History   Problem Relation Age of Onset    Heart Disease Mother     Diabetes Mother     Arthritis Father     Heart Disease Father     Cancer Father     High Blood Pressure Father      Allergies   Allergen Reactions    Gabapentin Diarrhea     diarrhea    Adhesive Tape     Metoprolol Other (See Comments)    Naproxen Diarrhea       Current Outpatient Medications   Medication Sig Dispense Refill    apixaban (ELIQUIS) 5 MG TABS tablet Take 5 mg by mouth 2 times daily Is to restart today      sotalol (BETAPACE) 120 MG tablet Take 240 mg by mouth 2 times daily      furosemide (LASIX) 40 MG tablet Take 40 mg by mouth daily      OXcarbazepine (TRILEPTAL) 150 MG tablet Take 1 tablet by mouth 2 times daily (Patient not taking: Reported on 12/21/2020) 60 tablet 3    citalopram (CELEXA) 40 MG tablet 10 mg Is to start again in 2 weeks from today  0 No current facility-administered medications for this visit. Review of Systems   Constitutional: Negative for fever. HENT: Negative for ear pain, tinnitus and trouble swallowing. Eyes: Negative for photophobia and visual disturbance. Respiratory: Negative for choking and shortness of breath. Cardiovascular: Negative for chest pain and palpitations. Gastrointestinal: Negative for nausea and vomiting. Musculoskeletal: Negative for back pain, gait problem, joint swelling, myalgias, neck pain and neck stiffness. Skin: Negative for color change. Allergic/Immunologic: Negative for food allergies. Neurological: Negative for dizziness, tremors, seizures, syncope, facial asymmetry, speech difficulty, weakness, light-headedness, numbness and headaches. Psychiatric/Behavioral: Negative for behavioral problems, confusion, hallucinations and sleep disturbance. Objective:   /76 (Site: Right Lower Arm, Position: Sitting, Cuff Size: Medium Adult)   Pulse 56   Temp 96.8 °F (36 °C)   Wt 275 lb (124.7 kg)   BMI 48.71 kg/m²     Physical Exam  Vitals signs reviewed. Eyes:      Pupils: Pupils are equal, round, and reactive to light. Neck:      Musculoskeletal: Normal range of motion. Cardiovascular:      Rate and Rhythm: Normal rate and regular rhythm. Heart sounds: No murmur. Pulmonary:      Effort: Pulmonary effort is normal.      Breath sounds: Normal breath sounds. Abdominal:      General: Bowel sounds are normal.   Musculoskeletal: Normal range of motion. Skin:     General: Skin is warm. Neurological:      Mental Status: She is alert and oriented to person, place, and time. Cranial Nerves: No cranial nerve deficit. Sensory: No sensory deficit. Motor: No abnormal muscle tone.       Coordination: Coordination normal. MCV 90.0 12/18/2020    MCH 30.3 12/18/2020    MCHC 33.7 12/18/2020    RDW 13.9 12/18/2020    PLT CLUMPED 12/18/2020    MPV 9.0 03/24/2014     Lab Results   Component Value Date     12/18/2020    K 3.8 12/18/2020     12/18/2020    CO2 31 12/18/2020    BUN 13 12/18/2020    CREATININE 0.84 12/18/2020    GFRAA >60.0 12/18/2020    LABGLOM >60.0 12/18/2020    GLUCOSE 113 12/18/2020    PROT 6.7 12/18/2020    LABALBU 4.4 12/18/2020    CALCIUM 9.3 12/18/2020    BILITOT 0.5 12/18/2020    ALKPHOS 72 12/18/2020    AST 19 12/18/2020    ALT 14 12/18/2020     Lab Results   Component Value Date    PROTIME 10.1 03/27/2017    INR 0.9 03/27/2017     Lab Results   Component Value Date    TSH 1.780 01/09/2019     Lab Results   Component Value Date    TRIG 110 03/28/2017    HDL 54 03/28/2017    LDLCALC 101 03/28/2017     Lab Results   Component Value Date    LABAMPH Neg 12/18/2020    BARBSCNU Neg 12/18/2020    LABBENZ Neg 12/18/2020    LABMETH Neg 12/18/2020    OPIATESCREENURINE Neg 12/18/2020    PHENCYCLIDINESCREENURINE Neg 12/18/2020    ETOH <10 12/18/2020     No results found for: LITHIUM, DILFRTOT, VALPROATE    Assessment:       Diagnosis Orders   1. Partial seizures ([facility] [address]     2.  TIA (transient ischemic attack) 65 a right-handed female who I had seen for what appeared to be TIA or complex partial seizures. We were not able to sort this out and we had treated her as a seizure and she was on Trileptal 300 mg twice a day. She weaned herself off the medication thinking she did not have seizures. She does have cardiac history she is on Eliquis she does have atrial fibrillation. Patient tolerated the Trileptal very well and she did not have hyponatremia. We had not seen until she presented to the emergency room with a 5 to 10-minute staring episode. Her eyes rolled up but she did not generalize. We were contacted from the emergency room for the same and we have started on Trileptal 150 mg twice a day and she is here for further evaluation. Patient again is not taking her medication. She has not any side effects. She had a CT scan which did not show thing significant. I recommended that she not discontinue the medication as she is a high risk for seizures. Past medical records were reviewed and obtained this consultation is an extended consultation which includes my consultation the emergency room at her presentation few weeks ago. Plan:      No orders of the defined types were placed in this encounter. No orders of the defined types were placed in this encounter. No follow-ups on file.       Richard Anthony MD

## 2020-12-30 ENCOUNTER — HOSPITAL ENCOUNTER (OUTPATIENT)
Dept: NEUROLOGY | Age: 70
Discharge: HOME | End: 2020-12-30
Payer: MEDICARE

## 2020-12-30 PROCEDURE — 95816 EEG AWAKE AND DROWSY: CPT

## 2021-01-03 PROCEDURE — 95816 EEG AWAKE AND DROWSY: CPT | Performed by: PSYCHIATRY & NEUROLOGY

## 2021-01-03 NOTE — PROCEDURES
Samira Servin [address]                      [address], [address] [facility]                          ELECTROENCEPHALOGRAM REPORT    PATIENT NAME: Sabas Celis                   :        1950  MED REC NO:   [id number]                            ROOM:  ACCOUNT NO:   [de-identified]                           ADMIT DATE: 2020  PROVIDER:     Hood Jaimes MD    DATE OF EE2020    MEDICATIONS:  Lasix, Betapace, and Trileptal.    EEG FINDINGS:  This is a spontaneous 21-channel EEG recording for this  patient with questionable partial seizures. The background rhythm of  this EEG shows 8 to 9 Hz posterior dominant rhythm of alpha. This is  symmetrical and attenuates with eye opening. EKG is monitored, this is  regular. Well-regulated alpha rhythms are seen throughout the EEG  recording. Record remains symmetrical throughout. Photic stimulation  was performed without any photic responses to seizures. Hyperventilation was performed with good effort. IMPRESSION:  This is a normal well-regulated EEG recording. Clinical  correlation is recommended.         Court Garcia MD    D: 2021 11:17:24       T: 2021 11:19:37     [email or web address]  Job#: [id number]     Doc#: [id number]    CC:

## 2021-06-01 ENCOUNTER — TELEPHONE (OUTPATIENT)
Dept: NEUROLOGY | Age: 71
End: 2021-06-01

## 2021-06-01 NOTE — TELEPHONE ENCOUNTER
Patient called to make appt, she has one 6/21. She states that she had a seizure over the weekend. She sates that she stopped taking her Triliptal 150 bid. It was making her feel bad,  changing her personality and depressed to the point of feeling suicidal. She Is not scheduled until 6/21, did you want to see her sooner or try her on another medication until seen? Please advise.

## 2021-06-03 RX ORDER — LAMOTRIGINE 100 MG/1
TABLET ORAL
Qty: 30 TABLET | Refills: 0 | Status: SHIPPED | OUTPATIENT
Start: 2021-06-03 | End: 2021-06-21 | Stop reason: SDUPTHER

## 2021-06-03 NOTE — TELEPHONE ENCOUNTER
Medication pended in refill encounter and patient is aware of change in medication and how to take it

## 2021-06-14 PROBLEM — M13.0 POLYARTHROPATHY: Status: ACTIVE | Noted: 2021-06-14

## 2021-06-14 PROBLEM — I10 ESSENTIAL HYPERTENSION: Status: ACTIVE | Noted: 2018-10-11

## 2021-06-14 PROBLEM — Z87.39 HISTORY OF ARTHRITIS: Status: ACTIVE | Noted: 2021-06-14

## 2021-06-14 PROBLEM — F32.A DEPRESSIVE DISORDER: Status: ACTIVE | Noted: 2021-06-14

## 2021-06-14 PROBLEM — G40.A09 ABSENCE SEIZURE (HCC): Status: ACTIVE | Noted: 2021-06-14

## 2021-06-14 PROBLEM — M54.9 CHRONIC BACK PAIN: Status: ACTIVE | Noted: 2021-06-14

## 2021-06-14 PROBLEM — Z86.69 HISTORY OF SEIZURE DISORDER: Status: ACTIVE | Noted: 2021-06-14

## 2021-06-14 PROBLEM — E55.9 VITAMIN D DEFICIENCY: Status: ACTIVE | Noted: 2021-06-14

## 2021-06-14 PROBLEM — Z87.09 HISTORY OF BRONCHITIS: Status: ACTIVE | Noted: 2021-06-14

## 2021-06-14 PROBLEM — Z78.0 POSTMENOPAUSAL STATE: Status: ACTIVE | Noted: 2021-06-14

## 2021-06-14 PROBLEM — R21 SKIN ERUPTION: Status: ACTIVE | Noted: 2021-06-14

## 2021-06-14 PROBLEM — E78.5 HYPERLIPIDEMIA: Status: ACTIVE | Noted: 2021-06-14

## 2021-06-14 PROBLEM — R03.0 FINDING OF ABOVE NORMAL BLOOD PRESSURE: Status: ACTIVE | Noted: 2021-06-14

## 2021-06-14 PROBLEM — G89.29 CHRONIC BACK PAIN: Status: ACTIVE | Noted: 2021-06-14

## 2021-06-14 PROBLEM — N18.2 STAGE 2 CHRONIC KIDNEY DISEASE: Status: ACTIVE | Noted: 2021-06-14

## 2021-06-14 PROBLEM — R06.00 DYSPNEA: Status: ACTIVE | Noted: 2021-06-14

## 2021-06-14 PROBLEM — R60.0 EDEMA OF FOOT: Status: ACTIVE | Noted: 2021-06-14

## 2021-06-14 PROBLEM — Z87.39 HISTORY OF BACK PROBLEMS: Status: ACTIVE | Noted: 2021-06-14

## 2021-06-14 PROBLEM — I27.20 PULMONARY HYPERTENSION (HCC): Status: ACTIVE | Noted: 2021-06-14

## 2021-06-14 PROBLEM — M25.50 MULTIPLE JOINT PAIN: Status: ACTIVE | Noted: 2021-06-14

## 2021-06-14 PROBLEM — I48.0 PAROXYSMAL ATRIAL FIBRILLATION (HCC): Status: ACTIVE | Noted: 2021-06-14

## 2021-06-21 ENCOUNTER — OFFICE VISIT (OUTPATIENT)
Dept: NEUROLOGY | Age: 71
End: 2021-06-21
Payer: MEDICARE

## 2021-06-21 VITALS
DIASTOLIC BLOOD PRESSURE: 67 MMHG | WEIGHT: 268 LBS | BODY MASS INDEX: 47.47 KG/M2 | HEART RATE: 59 BPM | SYSTOLIC BLOOD PRESSURE: 130 MMHG

## 2021-06-21 DIAGNOSIS — G40.A09 ABSENCE SEIZURE (HCC): Primary | ICD-10-CM

## 2021-06-21 DIAGNOSIS — R56.9 PARTIAL SEIZURES (HCC): ICD-10-CM

## 2021-06-21 PROCEDURE — G8427 DOCREV CUR MEDS BY ELIG CLIN: HCPCS | Performed by: PSYCHIATRY & NEUROLOGY

## 2021-06-21 PROCEDURE — 1036F TOBACCO NON-USER: CPT | Performed by: PSYCHIATRY & NEUROLOGY

## 2021-06-21 PROCEDURE — 4040F PNEUMOC VAC/ADMIN/RCVD: CPT | Performed by: PSYCHIATRY & NEUROLOGY

## 2021-06-21 PROCEDURE — 1090F PRES/ABSN URINE INCON ASSESS: CPT | Performed by: PSYCHIATRY & NEUROLOGY

## 2021-06-21 PROCEDURE — 99214 OFFICE O/P EST MOD 30 MIN: CPT | Performed by: PSYCHIATRY & NEUROLOGY

## 2021-06-21 PROCEDURE — 3017F COLORECTAL CA SCREEN DOC REV: CPT | Performed by: PSYCHIATRY & NEUROLOGY

## 2021-06-21 PROCEDURE — G8417 CALC BMI ABV UP PARAM F/U: HCPCS | Performed by: PSYCHIATRY & NEUROLOGY

## 2021-06-21 PROCEDURE — 1123F ACP DISCUSS/DSCN MKR DOCD: CPT | Performed by: PSYCHIATRY & NEUROLOGY

## 2021-06-21 PROCEDURE — G8400 PT W/DXA NO RESULTS DOC: HCPCS | Performed by: PSYCHIATRY & NEUROLOGY

## 2021-06-21 RX ORDER — LAMOTRIGINE 100 MG/1
TABLET ORAL
Qty: 60 TABLET | Refills: 3 | Status: SHIPPED | OUTPATIENT
Start: 2021-06-21 | End: 2022-01-31 | Stop reason: SDUPTHER

## 2021-06-21 ASSESSMENT — ENCOUNTER SYMPTOMS
PHOTOPHOBIA: 0
VOMITING: 0
TROUBLE SWALLOWING: 0
COLOR CHANGE: 0
BACK PAIN: 0
CHOKING: 0
SHORTNESS OF BREATH: 0
NAUSEA: 0

## 2021-06-21 NOTE — PROGRESS NOTES
Subjective:      Patient ID: Leon Lorenzana is a 66 y.o. female who presents today for:  Chief Complaint   Patient presents with    Follow-up     PT had problems with the oxcarbamzapine so we switcherd her to lamictal, she says that she seems to be doing okay on it, she says she is slightly emotional but nothing like she was on the other medication. She says since seen last she had one seizure, she says it was differernt than the others she had before, her  told her she made like a growl sound and her head falls back and her eyes rolled back and she was stiff, she says her jaw drops open as well. HPI 17-year-old right-handed female with a history of partial seizures with absence seizure's patient continues on Lamictal.  She was on Trileptal and she had a breakthrough seizure. She was on Trileptal 150 mg twice a day though she was not taking her medication. We had recommended to continue this if she had side effects and she had discontinued this and she had a seizure on the first June. We had therefore changed her to Lamictal.  Started to slowly and titrated this so she is now on 50 mg 2 times a day but any side effects. She is due to be titrated further and there for this appointment. Patient is on Eliquis. The seizure was a prolonged 1 according to her it scared her . She has not any side effects of Lamictal.  She still continue back pain.   She occasionally has sharp pains in one area of the body to another and occasional jerking this does not suggest seizures and could be myoclonic jerks    Past Medical History:   Diagnosis Date    Depression     Hypertension      Past Surgical History:   Procedure Laterality Date    JOINT REPLACEMENT      right hip    JOINT REPLACEMENT      right knee     Social History     Socioeconomic History    Marital status:      Spouse name: Not on file    Number of children: Not on file    Years of education: Not on file    Highest education level: Not on file   Occupational History    Not on file   Tobacco Use    Smoking status: Former Smoker     Start date: 6/7/1968    Smokeless tobacco: Never Used   Substance and Sexual Activity    Alcohol use: Yes     Alcohol/week: 6.0 standard drinks     Types: 6 Glasses of wine per week    Drug use: No    Sexual activity: Not on file   Other Topics Concern    Not on file   Social History Narrative    Not on file     Social Determinants of Health     Financial Resource Strain:     Difficulty of Paying Living Expenses:    Food Insecurity:     Worried About Running Out of Food in the Last Year:     [address] in the Last Year:    Transportation Needs:     Lack of Transportation (Medical):      Lack of Transportation (Non-Medical):    Physical Activity:     Days of Exercise per Week:     Minutes of Exercise per Session:    Stress:     Feeling of Stress :    Social Connections:     Frequency of Communication with Friends and Family:     Frequency of Social Gatherings with Friends and Family:     Attends Jew Services:     Active Member of Clubs or Organizations:     Attends Club or Organization Meetings:     Marital Status:    Intimate Partner Violence:     Fear of Current or Ex-Partner:     Emotionally Abused:     Physically Abused:     Sexually Abused:      Family History   Problem Relation Age of Onset    Heart Disease Mother     Diabetes Mother     Arthritis Father     Heart Disease Father     Cancer Father     High Blood Pressure Father      Allergies   Allergen Reactions    Gabapentin Diarrhea     diarrhea    Adhesive Tape     Metoprolol Other (See Comments)    Naproxen Diarrhea       Current Outpatient Medications   Medication Sig Dispense Refill    lamoTRIgine (LAMICTAL) 100 MG tablet 1 twice a day starting on 23 June 60 tablet 3    apixaban (ELIQUIS) 5 MG TABS tablet Take 5 mg by mouth 2 times daily Is to restart today      sotalol (BETAPACE) 120 MG tablet Take 240 mg by mouth 2 times daily      furosemide (LASIX) 40 MG tablet Take 40 mg by mouth daily       No current facility-administered medications for this visit. Review of Systems   Constitutional: Negative for fever. HENT: Negative for ear pain, tinnitus and trouble swallowing. Eyes: Negative for photophobia and visual disturbance. Respiratory: Negative for choking and shortness of breath. Cardiovascular: Negative for chest pain and palpitations. Gastrointestinal: Negative for nausea and vomiting. Musculoskeletal: Negative for back pain, gait problem, joint swelling, myalgias, neck pain and neck stiffness. Skin: Negative for color change. Allergic/Immunologic: Negative for food allergies. Neurological: Negative for dizziness, tremors, seizures, syncope, facial asymmetry, speech difficulty, weakness, light-headedness, numbness and headaches. Psychiatric/Behavioral: Negative for behavioral problems, confusion, hallucinations and sleep disturbance. Objective:   /67 (Site: Left Upper Arm, Position: Sitting, Cuff Size: Large Adult)   Pulse 59   Wt 268 lb (121.6 kg)   BMI 47.47 kg/m²     Physical Exam  Vitals reviewed. Eyes:      Pupils: Pupils are equal, round, and reactive to light. Cardiovascular:      Rate and Rhythm: Normal rate and regular rhythm. Heart sounds: No murmur heard. Pulmonary:      Effort: Pulmonary effort is normal.      Breath sounds: Normal breath sounds. Abdominal:      General: Bowel sounds are normal.   Musculoskeletal:         General: Normal range of motion. Cervical back: Normal range of motion. Skin:     General: Skin is warm. Neurological:      Mental Status: She is alert and oriented to person, place, and time. Cranial Nerves: No cranial nerve deficit. Sensory: No sensory deficit. Motor: No abnormal muscle tone.       Coordination: Coordination normal.      Deep Tendon Reflexes: Reflexes are normal and

## 2021-07-26 LAB — MAMMOGRAPHY, EXTERNAL: NORMAL

## 2021-10-21 ENCOUNTER — HOSPITAL ENCOUNTER (EMERGENCY)
Age: 71
Discharge: HOME | End: 2021-10-21
Attending: STUDENT IN AN ORGANIZED HEALTH CARE EDUCATION/TRAINING PROGRAM
Payer: MEDICARE

## 2021-10-21 ENCOUNTER — APPOINTMENT (OUTPATIENT)
Dept: CT IMAGING | Age: 71
End: 2021-10-21
Payer: MEDICARE

## 2021-10-21 ENCOUNTER — APPOINTMENT (OUTPATIENT)
Dept: GENERAL RADIOLOGY | Age: 71
End: 2021-10-21
Payer: MEDICARE

## 2021-10-21 VITALS
WEIGHT: 270 LBS | BODY MASS INDEX: 47.84 KG/M2 | DIASTOLIC BLOOD PRESSURE: 54 MMHG | TEMPERATURE: 97.7 F | SYSTOLIC BLOOD PRESSURE: 114 MMHG | OXYGEN SATURATION: 97 % | RESPIRATION RATE: 18 BRPM | HEIGHT: 63 IN | HEART RATE: 50 BPM

## 2021-10-21 DIAGNOSIS — U07.1 COVID: ICD-10-CM

## 2021-10-21 DIAGNOSIS — S09.90XA CLOSED HEAD INJURY, INITIAL ENCOUNTER: ICD-10-CM
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
## 2021-10-21 LAB — SARS-COV-2, NAAT: DETECTED

## 2021-10-21 PROCEDURE — 93005 ELECTROCARDIOGRAM TRACING: CPT | Performed by: STUDENT IN AN ORGANIZED HEALTH CARE EDUCATION/TRAINING PROGRAM

## 2021-10-21 PROCEDURE — 71045 X-RAY EXAM CHEST 1 VIEW: CPT

## 2021-10-21 PROCEDURE — 90471 IMMUNIZATION ADMIN: CPT | Performed by: STUDENT IN AN ORGANIZED HEALTH CARE EDUCATION/TRAINING PROGRAM

## 2021-10-21 PROCEDURE — 90715 TDAP VACCINE 7 YRS/> IM: CPT | Performed by: STUDENT IN AN ORGANIZED HEALTH CARE EDUCATION/TRAINING PROGRAM

## 2021-10-21 PROCEDURE — 87635 SARS-COV-2 COVID-19 AMP PRB: CPT

## 2021-10-21 PROCEDURE — 70450 CT HEAD/BRAIN W/O DYE: CPT

## 2021-10-21 PROCEDURE — 99285 EMERGENCY DEPT VISIT HI MDM: CPT

## 2021-10-21 PROCEDURE — 6370000000 HC RX 637 (ALT 250 FOR IP): Performed by: STUDENT IN AN ORGANIZED HEALTH CARE EDUCATION/TRAINING PROGRAM

## 2021-10-21 PROCEDURE — 72125 CT NECK SPINE W/O DYE: CPT

## 2021-10-21 PROCEDURE — 6360000002 HC RX W HCPCS: Performed by: STUDENT IN AN ORGANIZED HEALTH CARE EDUCATION/TRAINING PROGRAM

## 2021-10-21 RX ORDER — ACETAMINOPHEN 500 MG
1000 TABLET ORAL ONCE
Status: COMPLETED | OUTPATIENT
Start: 2021-10-21 | End: 2021-10-21

## 2021-10-21 RX ORDER — ACETAMINOPHEN 500 MG
500 TABLET ORAL 4 TIMES DAILY PRN
Qty: 120 TABLET | Refills: 0 | Status: SHIPPED | OUTPATIENT
Start: 2021-10-21

## 2021-10-21 RX ORDER — LORAZEPAM 1 MG/1
0.5 TABLET ORAL ONCE
Status: COMPLETED | OUTPATIENT
Start: 2021-10-21 | End: 2021-10-21

## 2021-10-21 RX ADMIN — ACETAMINOPHEN 1000 MG: 500 TABLET ORAL at 20:07

## 2021-10-21 RX ADMIN — TETANUS TOXOID, REDUCED DIPHTHERIA TOXOID AND ACELLULAR PERTUSSIS VACCINE, ADSORBED 0.5 ML: 5; 2.5; 8; 8; 2.5 SUSPENSION INTRAMUSCULAR at 21:59

## 2021-10-21 RX ADMIN — LORAZEPAM 0.5 MG: 1 TABLET ORAL at 20:07

## 2021-10-21 ASSESSMENT — ENCOUNTER SYMPTOMS
SHORTNESS OF BREATH: 0
NAUSEA: 0
VOMITING: 0
EYE PAIN: 0
FACIAL SWELLING: 0
BACK PAIN: 0
ABDOMINAL PAIN: 0

## 2021-10-21 ASSESSMENT — PAIN DESCRIPTION - LOCATION
LOCATION: HEAD
LOCATION: HEAD

## 2021-10-21 ASSESSMENT — PAIN DESCRIPTION - DESCRIPTORS: DESCRIPTORS: ACHING;SHARP

## 2021-10-21 ASSESSMENT — PAIN SCALES - GENERAL
PAINLEVEL_OUTOF10: 3
PAINLEVEL_OUTOF10: 5
PAINLEVEL_OUTOF10: 3
PAINLEVEL_OUTOF10: 8

## 2021-10-21 ASSESSMENT — PAIN DESCRIPTION - FREQUENCY: FREQUENCY: CONTINUOUS

## 2021-10-21 ASSESSMENT — PAIN DESCRIPTION - PAIN TYPE: TYPE: ACUTE PAIN

## 2021-10-21 NOTE — ED PROVIDER NOTES
[address] [facility] ED  eMERGENCY dEPARTMENT eNCOUnter      Pt Name: Renetta Mart  MRN: [id number]  [place] 1950  Date of evaluation: 10/21/2021  Provider: Opal Ray MD      HISTORY OF PRESENT ILLNESS      Chief Complaint   Patient presents with    Fall     pt brought in by ems. she states she was helping her  out of the car but he started to fall and knocked her over to the concrete. denies loc       The history is provided by the Patient. Renetta Mart is a 66 y.o. female with a PMH clinically significant for HTN, HLD, CKD, Pulmonary HTN, TIA, pAfib on eliquis, Chronic back pain and seizures presenting to the ED via EMS c/o Headache and hematoma s/p fall from standing occurring just pta. Characterizes Headache as aching, constant, moderate in severity. States her  was falling backwards and that she tried to catch him, causing her to fall backwards onto her bottom and her head. States only mild pain over her buttocks. Denies any associated numbness, weakness, tingling, neck pain, vision changes, chest pain, S OB, abdominal pain, N/V or extremity injuries. States symptoms worse with touching the back of her head. Improved with nothing, nothing tried PTA. States no history of similar previous episodes or frequent falls. States they have otherwise been feeling well. Taking all medications as indicated, including eliquis. Most recent ED evaluation and 12/2024 recurrence of seizures. Was not on antiepileptics at that time. Was able to be discharged home. REVIEW OF SYSTEMS       Review of Systems   Constitutional: Negative for activity change and fever. HENT: Negative for facial swelling and nosebleeds. Eyes: Negative for pain and visual disturbance. Respiratory: Negative for shortness of breath. Cardiovascular: Negative for chest pain. Gastrointestinal: Negative for abdominal pain, nausea and vomiting. Genitourinary: Negative for hematuria. Musculoskeletal: Positive for myalgias. Negative for back pain and neck pain. Skin: Positive for wound. Neurological: Positive for headaches. Negative for weakness and numbness. PAST MEDICAL HISTORY     Past Medical History:   Diagnosis Date    Depression     Hypertension        SURGICAL HISTORY       Past Surgical History:   Procedure Laterality Date    JOINT REPLACEMENT      right hip    JOINT REPLACEMENT      right knee       FAMILY HISTORY       Family History   Problem Relation Age of Onset    Heart Disease Mother     Diabetes Mother     Arthritis Father     Heart Disease Father     Cancer Father     High Blood Pressure Father        SOCIAL HISTORY       Social History     Socioeconomic History    Marital status:      Spouse name: Not on file    Number of children: Not on file    Years of education: Not on file    Highest education level: Not on file   Occupational History    Not on file   Tobacco Use    Smoking status: Former Smoker     Start date: 6/7/1968    Smokeless tobacco: Never Used   Substance and Sexual Activity    Alcohol use: Yes     Alcohol/week: 6.0 standard drinks     Types: 6 Glasses of wine per week    Drug use: No    Sexual activity: Not on file   Other Topics Concern    Not on file   Social History Narrative    Not on file     Social Determinants of Health     Financial Resource Strain:     Difficulty of Paying Living Expenses:    Food Insecurity:     Worried About Running Out of Food in the Last Year:     [address] in the Last Year:    Transportation Needs:     Lack of Transportation (Medical):      Lack of Transportation (Non-Medical):    Physical Activity:     Days of Exercise per Week:     Minutes of Exercise per Session:    Stress:     Feeling of Stress :    Social Connections:     Frequency of Communication with Friends and Family:     Frequency of Social Gatherings with Friends and Family:     Attends [employer] Services:     Active Member of Clubs or Organizations:     Attends Club or Organization Meetings:     Marital Status:    Intimate Partner Violence:     Fear of Current or Ex-Partner:     Emotionally Abused:     Physically Abused:     Sexually Abused:        CURRENT MEDICATIONS       Previous Medications    APIXABAN (ELIQUIS) 5 MG TABS TABLET    Take 5 mg by mouth 2 times daily Is to restart today    FUROSEMIDE (LASIX) 40 MG TABLET    Take 40 mg by mouth daily    LAMOTRIGINE (LAMICTAL) 100 MG TABLET    1 twice a day starting on 23 June    SOTALOL (BETAPACE) 120 MG TABLET    Take 240 mg by mouth 2 times daily       ALLERGIES     Gabapentin, Adhesive tape, Metoprolol, and Naproxen      PHYSICAL EXAM       ED Triage Vitals [10/21/21 1922]   BP Temp Temp src Pulse Resp SpO2 Height Weight   132/71 -- -- 50 20 98 % -- --       Physical Exam  Vitals and nursing note reviewed. Exam conducted with a chaperone present. Constitutional:       General: She is not in acute distress. HENT:      Head: Normocephalic and atraumatic. No raccoon eyes, Perales's sign, abrasion, contusion, right periorbital erythema or left periorbital erythema. Jaw: There is normal jaw occlusion. Right Ear: Tympanic membrane normal.      Left Ear: Tympanic membrane normal.      Nose:      Right Nostril: No septal hematoma. Left Nostril: No septal hematoma. Mouth/Throat:      Mouth: Mucous membranes are moist. No injury. Pharynx: Oropharynx is clear. Eyes:      Extraocular Movements: Extraocular movements intact. Pupils: Pupils are equal, round, and reactive to light. Neck:      Trachea: Trachea normal.   Cardiovascular:      Rate and Rhythm: Normal rate and regular rhythm. Pulses: Normal pulses. Pulmonary:      Effort: No respiratory distress. Breath sounds: Normal breath sounds. Chest:      Chest wall: No deformity, tenderness or crepitus. Abdominal:      General: There is no distension.       Palpations: Abdomen is soft. Tenderness: There is no abdominal tenderness. Musculoskeletal:         General: No deformity. Cervical back: Normal. No signs of trauma, tenderness or bony tenderness. No spinous process tenderness. Thoracic back: Normal. No signs of trauma, tenderness or bony tenderness. Lumbar back: Normal. No signs of trauma, tenderness or bony tenderness. Right lower leg: No edema. Left lower leg: No edema. Comments: Pelvis Stable   Skin:     General: Skin is warm and dry. Capillary Refill: Capillary refill takes less than 2 seconds. Neurological:      General: No focal deficit present. Mental Status: She is alert and oriented to person, place, and time. Sensory: No sensory deficit. Motor: No weakness. Psychiatric:         Mood and Affect: Mood is anxious. Behavior: Behavior normal.         MDM:   Chart Reviewed: PMH and additional information as noted in HPI obtained from chart review    Vitals:    Vitals:    10/21/21 1922 10/21/21 1936 10/21/21 2115   BP: 132/71 (!) 123/55 (!) 114/54   Pulse: 50 (!) 48 50   Resp: 20 18 18   Temp:  97.7 °F (36.5 °C)    TempSrc:  Oral    SpO2: 98% 97% 97%   Weight:  270 lb (122.5 kg)    Height:  5' 3\" (1.6 m)        PROCEDURES:  Unless otherwise noted below, none  Procedures    LABS:  Labs Reviewed   COVID-19, RAPID - Abnormal; Notable for the following components:       Result Value    SARS-CoV-2, NAAT DETECTED (*)     All other components within normal limits    Narrative:     Nancy Ortega tel. [phone],  COVID results called to and read back by Danita Boss RN, 10/21/2021 22:03,  by [address]    (Results Pending)   CT HEAD WO CONTRAST    (Results Pending)   XR CHEST PORTABLE    (Results Pending)       ED Course as of Oct 21 2255   Thu Oct 21, 2021   2021 EKG showing sinus bradycardia, rate of 45 bpm.  Normal axis, normal intervals. No ST T wave abnormalities.  no acute ischemic changes. EKG 12 Lead [NA]   2118 Moderate right parietal scalp hematoma. No skull fracture, acute bleed, infarct, or acute intracranial abnormality. CT HEAD WO CONTRAST [NA]   2119 No acute fracture. Moderate degenerative disc disease changes seen in the lower cervical spine. CT CERVICAL SPINE WO CONTRAST [NA]   2126 No acute traumatic findings. No acute cardiopulmonary abnormalities. XR CHEST PORTABLE [NA]   2223 SARS-CoV-2, NAAT(!): DETECTED [NA]      ED Course User Index  [NA] Sarah Arias MD       66 y.o. female with a PMH clinically significant for HTN, HLD, CKD, Pulmonary HTN, TIA, pAfib on eliquis, Chronic back pain and seizures presenting to the ED via EMS c/o Headache and hematoma s/p fall from standing occurring just pta. Upon initial evaluation, Pt Afebrile, HDS and in NAD. PE as noted above. EKG, and Imaging as noted above. Given findings, clinical presentation most likely consistent w/ traumatic cephalhematoma over the right occipital region without active bleeding. No evidence of rapidly expanding hematoma. Given fall and presence of anticoagulation, obtained CT head, C-spine and portable chest x-ray. No evidence of acute intracranial or C-spine injuries. Chest x-ray without any acute traumatic findings. Patient otherwise well-appearing in the ED. Did note history of mild cough over the past 2 days and patient's  with Covid at home. Requested Covid testing in the ED which was positive. Recommended acetaminophen for symptomatic relief. No indications for dexamethasone at this time. Tolerated p.o. intake and ambulatory challenges without difficulty. Given findings, stable for further evaluation and management as an outpatient. Tetanus updated in the ED.   Pt was administered   Medications   acetaminophen (TYLENOL) tablet 1,000 mg (1,000 mg Oral Given 10/21/21 2007)   LORazepam (ATIVAN) tablet 0.5 mg (0.5 mg Oral Given 10/21/21 2007)   Tetanus-Diphth-Acell Pertussis (BOOSTRIX) injection 0.5 mL (0.5 mLs IntraMUSCular Given 10/21/21 9417)       Plan: Discharge home in good condition with meds as noted below and instructions to follow up with PCP  . Pt stable and appropriate for further evaluation and management as an outpatient. and Patient understanding and amenable to the POC. CRITICAL CARE TIME   Total CriticalCare time was 0 minutes, excluding separately reportable procedures. There was a high probability of clinically significant/life threatening deterioration in the patient's condition which required my urgent intervention. FINAL IMPRESSION      1. Fall, initial encounter    2. Closed head injury, initial encounter    3. Cephalohematoma    4.  COVID          DISPOSITION/PLAN   DISPOSITION Discharge - Pending Orders Complete 10/21/2021 10:25:28 PM      Current Discharge Medication List      START taking these medications    Details   acetaminophen (TYLENOL) 500 MG tablet Take 1 tablet by mouth 4 times daily as needed for Pain  Qty: 120 tablet, Refills: 0              Elinore Lanes, MD Elinore Lanes, MD  10/21/21 5400

## 2021-10-21 NOTE — ED TRIAGE NOTES
Pt states her  was dx with covid this afternoon and d/c home from ER. She states he was weak and having trouble walking and when he was getting out of the car he fell on her causing her to fall. She denies loc. But c/o pain on back of head, denies neck, back, pelvis or leg pain. Pt is a/o x 4 anxious, skin p/w/d resp even and non-labored. No obvious injury or acute distress noted.

## 2021-10-21 NOTE — ED NOTES
Bed: 05  Expected date: 10/21/21  Expected time: 7:23 PM  Means of arrival: [facility]  Comments:  CAT 3trauma  66year old female fall  + thinners  Hematoma back of head  Right hip/shoulder pain  122/71  50  98%  [facility], RN  10/21/21 1935

## 2021-10-22 ENCOUNTER — CARE COORDINATION (OUTPATIENT)
Dept: CARE COORDINATION | Age: 71
End: 2021-10-22

## 2021-10-22 LAB
EKG ATRIAL RATE: 45 BPM
EKG P AXIS: 46 DEGREES
EKG P-R INTERVAL: 172 MS
EKG Q-T INTERVAL: 522 MS
EKG QRS DURATION: 86 MS
EKG QTC CALCULATION (BAZETT): 451 MS
EKG R AXIS: 64 DEGREES
EKG T AXIS: 49 DEGREES
EKG VENTRICULAR RATE: 45 BPM

## 2021-10-22 PROCEDURE — 93010 ELECTROCARDIOGRAM REPORT: CPT | Performed by: INTERNAL MEDICINE

## 2021-10-22 NOTE — CARE COORDINATION
Patient contacted regarding COVID-19 diagnosis. Discussed COVID-19 related testing which was available at this time. Test results were positive. Patient informed of results, if available? Yes. Ambulatory Care Manager contacted the patient by telephone to perform post discharge assessment. Call within 2 business days of discharge: Yes. Verified name and  with patient as identifiers. Provided introduction to self, and explanation of the CTN/ACM role, and reason for call due to risk factors for infection and/or exposure to COVID-19. Symptoms reviewed with patient who verbalized the following symptoms: fatigue, pain or aching joints, no new symptoms and no worsening symptoms. Due to no new or worsening symptoms encounter was not routed to provider for escalation. Discussed follow-up appointments. If no appointment was previously scheduled, appointment scheduling offered: Yes and PCP is with [facility]. [facility] follow up appointment(s):   Future Appointments   Date Time Provider Jazmine Sandhu   10/29/2021  8:45 AM Lambert Steel MD [facility]     [id number] follow up appointment(s): unknown    Non-face-to-face services provided:  Obtained and reviewed discharge summary and/or continuity of care documents     Advance Care Planning:   Does patient have an Advance Directive:  not on file. Educated patient about risk for severe COVID-19 due to risk factors according to CDC guidelines. ACM reviewed discharge instructions, medical action plan and red flag symptoms with the patient who verbalized understanding. Discussed COVID vaccination status: Yes and unvaccinated. Education provided on COVID-19 vaccination as appropriate. Discussed exposure protocols and quarantine with CDC Guidelines. Patient was given an opportunity to verbalize any questions and concerns and agrees to contact ACM or health care provider for questions related to their healthcare.     Reviewed and educated patient on any new and

## 2021-10-29 ENCOUNTER — CARE COORDINATION (OUTPATIENT)
Dept: CARE COORDINATION | Age: 71
End: 2021-10-29

## 2021-10-29 ENCOUNTER — HOSPITAL ENCOUNTER (EMERGENCY)
Age: 71
Discharge: HOME | End: 2021-10-29
Attending: EMERGENCY MEDICINE
Payer: MEDICARE

## 2021-10-29 ENCOUNTER — APPOINTMENT (OUTPATIENT)
Dept: CT IMAGING | Age: 71
End: 2021-10-29
Payer: MEDICARE

## 2021-10-29 VITALS
SYSTOLIC BLOOD PRESSURE: 131 MMHG | DIASTOLIC BLOOD PRESSURE: 80 MMHG | BODY MASS INDEX: 46.07 KG/M2 | TEMPERATURE: 97.4 F | HEIGHT: 63 IN | HEART RATE: 80 BPM | RESPIRATION RATE: 20 BRPM | WEIGHT: 260 LBS | OXYGEN SATURATION: 96 %

## 2021-10-29 DIAGNOSIS — U07.1 COVID: ICD-10-CM

## 2021-10-29 DIAGNOSIS — J12.82 PNEUMONIA DUE TO COVID-19 VIRUS: Primary | ICD-10-CM

## 2021-10-29 DIAGNOSIS — U07.1 PNEUMONIA DUE TO COVID-19 VIRUS: Primary | ICD-10-CM

## 2021-10-29 DIAGNOSIS — R06.02 SHORTNESS OF BREATH: ICD-10-CM

## 2021-10-29 LAB
ALBUMIN SERPL-MCNC: 4 G/DL (ref 3.5–4.6)
ALP BLD-CCNC: 78 U/L (ref 40–130)
ALT SERPL-CCNC: 19 U/L (ref 0–33)
ANION GAP SERPL CALCULATED.3IONS-SCNC: 10 MEQ/L (ref 9–15)
AST SERPL-CCNC: 28 U/L (ref 0–35)
BASOPHILS ABSOLUTE: 0 K/UL (ref 0–0.2)
BASOPHILS RELATIVE PERCENT: 0.6 %
BILIRUB SERPL-MCNC: 0.8 MG/DL (ref 0.2–0.7)
BUN BLDV-MCNC: 10 MG/DL (ref 8–23)
CALCIUM SERPL-MCNC: 9.2 MG/DL (ref 8.5–9.9)
CHLORIDE BLD-SCNC: 102 MEQ/L (ref 95–107)
CO2: 26 MEQ/L (ref 20–31)
CREAT SERPL-MCNC: 0.88 MG/DL (ref 0.5–0.9)
EOSINOPHILS ABSOLUTE: 0.2 K/UL (ref 0–0.7)
EOSINOPHILS RELATIVE PERCENT: 3.2 %
GFR AFRICAN AMERICAN: >60
GFR NON-AFRICAN AMERICAN: >60
GLOBULIN: 2.4 G/DL (ref 2.3–3.5)
GLUCOSE BLD-MCNC: 90 MG/DL (ref 70–99)
HCT VFR BLD CALC: 50.6 % (ref 37–47)
HEMOGLOBIN: 17 G/DL (ref 12–16)
LYMPHOCYTES ABSOLUTE: 2.7 K/UL (ref 1–4.8)
LYMPHOCYTES RELATIVE PERCENT: 38.6 %
MAGNESIUM: 2.1 MG/DL (ref 1.7–2.4)
MCH RBC QN AUTO: 29.7 PG (ref 27–31.3)
MCHC RBC AUTO-ENTMCNC: 33.5 % (ref 33–37)
MCV RBC AUTO: 88.5 FL (ref 82–100)
MONOCYTES ABSOLUTE: 0.6 K/UL (ref 0.2–0.8)
MONOCYTES RELATIVE PERCENT: 8.9 %
NEUTROPHILS ABSOLUTE: 3.5 K/UL (ref 1.4–6.5)
NEUTROPHILS RELATIVE PERCENT: 48.7 %
PDW BLD-RTO: 13.8 % (ref 11.5–14.5)
PLATELET # BLD: 262 K/UL (ref 130–400)
POTASSIUM SERPL-SCNC: 4.8 MEQ/L (ref 3.4–4.9)
PRO-BNP: 512 PG/ML
RBC # BLD: 5.72 M/UL (ref 4.2–5.4)
SARS-COV-2, NAAT: DETECTED
SODIUM BLD-SCNC: 138 MEQ/L (ref 135–144)
TOTAL PROTEIN: 6.4 G/DL (ref 6.3–8)
TROPONIN: <0.01 NG/ML (ref 0–0.01)
WBC # BLD: 7.1 K/UL (ref 4.8–10.8)

## 2021-10-29 PROCEDURE — 96374 THER/PROPH/DIAG INJ IV PUSH: CPT

## 2021-10-29 PROCEDURE — 6360000004 HC RX CONTRAST MEDICATION: Performed by: EMERGENCY MEDICINE

## 2021-10-29 PROCEDURE — 84484 ASSAY OF TROPONIN QUANT: CPT

## 2021-10-29 PROCEDURE — 87635 SARS-COV-2 COVID-19 AMP PRB: CPT

## 2021-10-29 PROCEDURE — 36415 COLL VENOUS BLD VENIPUNCTURE: CPT

## 2021-10-29 PROCEDURE — 83880 ASSAY OF NATRIURETIC PEPTIDE: CPT

## 2021-10-29 PROCEDURE — 71275 CT ANGIOGRAPHY CHEST: CPT

## 2021-10-29 PROCEDURE — 93005 ELECTROCARDIOGRAM TRACING: CPT | Performed by: EMERGENCY MEDICINE

## 2021-10-29 PROCEDURE — 99284 EMERGENCY DEPT VISIT MOD MDM: CPT

## 2021-10-29 PROCEDURE — 83735 ASSAY OF MAGNESIUM: CPT

## 2021-10-29 PROCEDURE — 2580000003 HC RX 258: Performed by: EMERGENCY MEDICINE

## 2021-10-29 PROCEDURE — 80053 COMPREHEN METABOLIC PANEL: CPT

## 2021-10-29 PROCEDURE — 85025 COMPLETE CBC W/AUTO DIFF WBC: CPT

## 2021-10-29 PROCEDURE — 6360000002 HC RX W HCPCS: Performed by: EMERGENCY MEDICINE

## 2021-10-29 RX ORDER — SODIUM CHLORIDE 9 MG/ML
INJECTION, SOLUTION INTRAVENOUS CONTINUOUS
Status: CANCELLED | OUTPATIENT
Start: 2021-10-29

## 2021-10-29 RX ORDER — SODIUM CHLORIDE 9 MG/ML
25 INJECTION, SOLUTION INTRAVENOUS PRN
Status: CANCELLED | OUTPATIENT
Start: 2021-10-29

## 2021-10-29 RX ORDER — 0.9 % SODIUM CHLORIDE 0.9 %
1000 INTRAVENOUS SOLUTION INTRAVENOUS ONCE
Status: COMPLETED | OUTPATIENT
Start: 2021-10-29 | End: 2021-10-29

## 2021-10-29 RX ORDER — EPINEPHRINE 1 MG/ML
0.3 INJECTION, SOLUTION, CONCENTRATE INTRAVENOUS PRN
Status: CANCELLED | OUTPATIENT
Start: 2021-10-29

## 2021-10-29 RX ORDER — DEXAMETHASONE SODIUM PHOSPHATE 10 MG/ML
6 INJECTION INTRAMUSCULAR; INTRAVENOUS ONCE
Status: COMPLETED | OUTPATIENT
Start: 2021-10-29 | End: 2021-10-29

## 2021-10-29 RX ORDER — MORPHINE SULFATE 2 MG/ML
4 INJECTION, SOLUTION INTRAMUSCULAR; INTRAVENOUS
Status: DISCONTINUED | OUTPATIENT
Start: 2021-10-29 | End: 2021-10-29 | Stop reason: HOSPADM

## 2021-10-29 RX ORDER — METHYLPREDNISOLONE SODIUM SUCCINATE 125 MG/2ML
125 INJECTION, POWDER, LYOPHILIZED, FOR SOLUTION INTRAMUSCULAR; INTRAVENOUS ONCE
Status: CANCELLED | OUTPATIENT
Start: 2021-10-29 | End: 2021-10-29

## 2021-10-29 RX ORDER — SODIUM CHLORIDE 0.9 % (FLUSH) 0.9 %
10 SYRINGE (ML) INJECTION ONCE
Status: DISCONTINUED | OUTPATIENT
Start: 2021-10-29 | End: 2021-10-29 | Stop reason: HOSPADM

## 2021-10-29 RX ORDER — ONDANSETRON 2 MG/ML
4 INJECTION INTRAMUSCULAR; INTRAVENOUS ONCE
Status: DISCONTINUED | OUTPATIENT
Start: 2021-10-29 | End: 2021-10-29 | Stop reason: HOSPADM

## 2021-10-29 RX ORDER — DIPHENHYDRAMINE HYDROCHLORIDE 50 MG/ML
50 INJECTION INTRAMUSCULAR; INTRAVENOUS ONCE
Status: CANCELLED | OUTPATIENT
Start: 2021-10-29 | End: 2021-10-29

## 2021-10-29 RX ORDER — HEPARIN SODIUM (PORCINE) LOCK FLUSH IV SOLN 100 UNIT/ML 100 UNIT/ML
500 SOLUTION INTRAVENOUS PRN
Status: CANCELLED | OUTPATIENT
Start: 2021-10-29

## 2021-10-29 RX ORDER — SODIUM CHLORIDE 0.9 % (FLUSH) 0.9 %
5-40 SYRINGE (ML) INJECTION PRN
Status: CANCELLED | OUTPATIENT
Start: 2021-10-29

## 2021-10-29 RX ORDER — DEXAMETHASONE 6 MG/1
6 TABLET ORAL 2 TIMES DAILY WITH MEALS
Qty: 20 TABLET | Refills: 0 | Status: SHIPPED | OUTPATIENT
Start: 2021-10-29 | End: 2021-11-08

## 2021-10-29 RX ADMIN — DEXAMETHASONE SODIUM PHOSPHATE 6 MG: 10 INJECTION INTRAMUSCULAR; INTRAVENOUS at 14:51

## 2021-10-29 RX ADMIN — IOPAMIDOL 100 ML: 755 INJECTION, SOLUTION INTRAVENOUS at 16:56

## 2021-10-29 RX ADMIN — SODIUM CHLORIDE 1000 ML: 9 INJECTION, SOLUTION INTRAVENOUS at 14:52

## 2021-10-29 ASSESSMENT — ENCOUNTER SYMPTOMS
COUGH: 1
SHORTNESS OF BREATH: 1
NAUSEA: 0
VOMITING: 0
ABDOMINAL PAIN: 0
BACK PAIN: 0
DIARRHEA: 0
SORE THROAT: 0

## 2021-10-29 NOTE — ED TRIAGE NOTES
Pt was Dx with covid on 10/16, c/o increased SOB and weakness, Pt is A&OX3, clam, ambulatory, afebrile, breathes are equal and unlabored,

## 2021-10-29 NOTE — CARE COORDINATION
Patient contacted regarding COVID-19 diagnosis. Discussed COVID-19 related testing which was available at this time. Test results were positive. Patient informed of results, if available? Yes    Ambulatory Care Manager contacted the patient by telephone to perform follow-up assessment. Verified name and  with patient as identifiers. Patient has following risk factors of: Hypertension. Symptoms reviewed with patient who verbalized the following symptoms: cough and shortness of breath. Due to worsening symptoms encounter was not routed to provider for escalation but patient was instructed to return to the emergency department at this time. Educated patient about risk for severe COVID-19 due to risk factors according to CDC guidelines. ACM reviewed discharge instructions, medical action plan and red flag symptoms with the patient who verbalized understanding. Discussed COVID vaccination status: Yes. Education provided on COVID-19 vaccination as appropriate. Discussed exposure protocols and quarantine with CDC Guidelines. Patient was given an opportunity to verbalize any questions and concerns and agrees to contact ACM or health care provider for questions related to their healthcare. Was patient discharged with a pulse oximeter? No    ACM provided contact information. Plan for follow-up call in 3-5 days based on severity of symptoms and risk factors.

## 2021-10-29 NOTE — ED PROVIDER NOTES
(LAMICTAL) 100 MG TABLET    1 twice a day starting on 23 June    SOTALOL (BETAPACE) 120 MG TABLET    Take 240 mg by mouth 2 times daily       ALLERGIES     Gabapentin, Adhesive tape, Metoprolol, and Naproxen    FAMILY HISTORY       Family History   Problem Relation Age of Onset    Heart Disease Mother     Diabetes Mother     Arthritis Father     Heart Disease Father     Cancer Father     High Blood Pressure Father           SOCIAL HISTORY       Social History     Socioeconomic History    Marital status:      Spouse name: None    Number of children: None    Years of education: None    Highest education level: None   Occupational History    None   Tobacco Use    Smoking status: Former Smoker     Start date: 6/7/1968    Smokeless tobacco: Never Used   Substance and Sexual Activity    Alcohol use: Yes     Alcohol/week: 6.0 standard drinks     Types: 6 Glasses of wine per week    Drug use: No    Sexual activity: None   Other Topics Concern    None   Social History Narrative    None     Social Determinants of Health     Financial Resource Strain:     Difficulty of Paying Living Expenses:    Food Insecurity:     Worried About Running Out of Food in the Last Year:     Ran Out of Food in the Last Year:    Transportation Needs:     Lack of Transportation (Medical):      Lack of Transportation (Non-Medical):    Physical Activity:     Days of Exercise per Week:     Minutes of Exercise per Session:    Stress:     Feeling of Stress :    Social Connections:     Frequency of Communication with Friends and Family:     Frequency of Social Gatherings with Friends and Family:     Attends Orthodox Services:     Active Member of Clubs or Organizations:     Attends Club or Organization Meetings:     Marital Status:    Intimate Partner Violence:     Fear of Current or Ex-Partner:     Emotionally Abused:     Physically Abused:     Sexually Abused:          PHYSICAL EXAM       ED Triage Vitals   BP Temp Temp Source Pulse Resp SpO2 Height Weight   10/29/21 1403 10/29/21 1400 10/29/21 1400 10/29/21 1400 10/29/21 1400 10/29/21 1400 10/29/21 1400 10/29/21 1400   126/85 97.4 °F (36.3 °C) Oral 85 20 98 % 5' 3\" (1.6 m) 260 lb (117.9 kg)       Physical Exam  Vitals and nursing note reviewed. Constitutional:       Appearance: She is well-developed. HENT:      Head: Normocephalic. Right Ear: External ear normal.      Left Ear: External ear normal.   Eyes:      Conjunctiva/sclera: Conjunctivae normal.      Pupils: Pupils are equal, round, and reactive to light. Neck:      Comments: No cspine tenderness  Cardiovascular:      Rate and Rhythm: Normal rate and regular rhythm. Heart sounds: Normal heart sounds. Pulmonary:      Effort: Pulmonary effort is normal.      Breath sounds: Normal breath sounds. Abdominal:      General: Bowel sounds are normal. There is no distension. Palpations: Abdomen is soft. Tenderness: There is no abdominal tenderness. Musculoskeletal:         General: Normal range of motion. Cervical back: Normal range of motion and neck supple. Skin:     General: Skin is warm and dry. Neurological:      Mental Status: She is alert and oriented to person, place, and time. Psychiatric:         Mood and Affect: Mood normal.           MDM  67 yo female presents to the ED with headache, sob, cough. Pt is afebrile, hemodynamically stable. Pt given 1 L NS, IV decadron in the ED. CT head, cspine reviewed from 10/21 which were negative. Pt given IV morphine, IV zofran in the ED. EKG shows afib with HR 74, normal axis, normal intervals, no ST changes. Labs unremarkable. Pt is covid positive. CT PE shows diffuse bilateral infiltrates. Pt reassessed and feels much better. Pt remained above 96%. Pt educated about covid pneumonia and monoclonal antibodies. Pharmacist called and pt was set up with [facility] outpatient.   Care coordinator Pratima Leonard helped setting patient up with [facility]. Pt given sob and covid pneumonia warning signs and will f/u with outpatient infusion Monday. Pt understands plan. Pt given prescription for decadron. FINAL IMPRESSION      1.  Pneumonia due to COVID-19 virus          DISPOSITION/PLAN   DISPOSITION Decision To Discharge 10/29/2021 05:44:35 PM        DISCHARGE MEDICATIONS:  [unfilled]         Janice Sherman MD(electronically signed)  Attending Emergency Physician            Janice Sherman MD  10/29/21 [phone]

## 2021-10-30 LAB
GFR AFRICAN AMERICAN: >60
GFR NON-AFRICAN AMERICAN: >60
PERFORMED ON: NORMAL
POC CREATININE: 0.9 MG/DL (ref 0.6–1.2)
POC SAMPLE TYPE: NORMAL

## 2021-11-01 ENCOUNTER — HOSPITAL ENCOUNTER (OUTPATIENT)
Dept: INFUSION THERAPY | Age: 71
Setting detail: INFUSION SERIES
Discharge: HOME | End: 2021-11-01
Payer: MEDICARE

## 2021-11-01 ENCOUNTER — CARE COORDINATION (OUTPATIENT)
Dept: CARE COORDINATION | Age: 71
End: 2021-11-01

## 2021-11-01 VITALS
TEMPERATURE: 98 F | RESPIRATION RATE: 18 BRPM | OXYGEN SATURATION: 97 % | SYSTOLIC BLOOD PRESSURE: 148 MMHG | DIASTOLIC BLOOD PRESSURE: 67 MMHG | HEART RATE: 44 BPM

## 2021-11-01 DIAGNOSIS — U07.1 COVID: Primary | ICD-10-CM

## 2021-11-01 LAB
EKG ATRIAL RATE: 75 BPM
EKG Q-T INTERVAL: 432 MS
EKG QRS DURATION: 76 MS
EKG QTC CALCULATION (BAZETT): 479 MS
EKG R AXIS: 58 DEGREES
EKG T AXIS: -32 DEGREES
EKG VENTRICULAR RATE: 74 BPM

## 2021-11-01 PROCEDURE — 93010 ELECTROCARDIOGRAM REPORT: CPT | Performed by: INTERNAL MEDICINE

## 2021-11-01 PROCEDURE — 2500000003 HC RX 250 WO HCPCS: Performed by: EMERGENCY MEDICINE

## 2021-11-01 PROCEDURE — 6360000002 HC RX W HCPCS: Performed by: EMERGENCY MEDICINE

## 2021-11-01 PROCEDURE — 2580000003 HC RX 258

## 2021-11-01 PROCEDURE — M0243 CASIRIVI AND IMDEVI INFUSION: HCPCS

## 2021-11-01 PROCEDURE — 2580000003 HC RX 258: Performed by: EMERGENCY MEDICINE

## 2021-11-01 RX ORDER — EPINEPHRINE 1 MG/ML
0.3 INJECTION, SOLUTION, CONCENTRATE INTRAVENOUS PRN
Status: CANCELLED | OUTPATIENT
Start: 2021-11-01

## 2021-11-01 RX ORDER — SODIUM CHLORIDE 9 MG/ML
25 INJECTION, SOLUTION INTRAVENOUS PRN
Status: CANCELLED | OUTPATIENT
Start: 2021-11-01

## 2021-11-01 RX ORDER — SODIUM CHLORIDE 0.9 % (FLUSH) 0.9 %
5-40 SYRINGE (ML) INJECTION PRN
Status: CANCELLED | OUTPATIENT
Start: 2021-11-01

## 2021-11-01 RX ORDER — METHYLPREDNISOLONE SODIUM SUCCINATE 125 MG/2ML
125 INJECTION, POWDER, LYOPHILIZED, FOR SOLUTION INTRAMUSCULAR; INTRAVENOUS ONCE
Status: CANCELLED | OUTPATIENT
Start: 2021-11-01 | End: 2021-11-01

## 2021-11-01 RX ORDER — SODIUM CHLORIDE 9 MG/ML
INJECTION, SOLUTION INTRAVENOUS CONTINUOUS
Status: CANCELLED | OUTPATIENT
Start: 2021-11-01

## 2021-11-01 RX ORDER — DIPHENHYDRAMINE HYDROCHLORIDE 50 MG/ML
50 INJECTION INTRAMUSCULAR; INTRAVENOUS ONCE
Status: CANCELLED | OUTPATIENT
Start: 2021-11-01 | End: 2021-11-01

## 2021-11-01 RX ORDER — SODIUM CHLORIDE 9 MG/ML
INJECTION, SOLUTION INTRAVENOUS
Status: COMPLETED
Start: 2021-11-01 | End: 2021-11-01

## 2021-11-01 RX ORDER — HEPARIN SODIUM (PORCINE) LOCK FLUSH IV SOLN 100 UNIT/ML 100 UNIT/ML
500 SOLUTION INTRAVENOUS PRN
Status: CANCELLED | OUTPATIENT
Start: 2021-11-01

## 2021-11-01 RX ADMIN — SODIUM CHLORIDE 250 ML: 9 INJECTION, SOLUTION INTRAVENOUS at 14:52

## 2021-11-01 RX ADMIN — IMDEVIMAB: 300 INJECTION, SOLUTION, CONCENTRATE INTRAVENOUS at 15:15

## 2021-11-01 NOTE — FLOWSHEET NOTE
Patient to the floor via wheelchair for her Regenron infusion. Vital signs taken. Education given both verbal and handout regarding this treatment. Aware that this is experimental and given emergency use. Agrees to proceed. Call light within reach.

## 2021-11-01 NOTE — FLOWSHEET NOTE
No side effects noted. Iv removed patient left the unit via wheelchair with the nurse. All equipment used in the care for this patient has been cleaned.

## 2021-11-01 NOTE — CARE COORDINATION
Patient contacted regarding COVID-19 diagnosis and monoclonal antibody infusion follow up. Discussed COVID-19 related testing which was available at this time. Test results were positive. Patient informed of results, if available? Yes    Ambulatory Care Manager contacted the patient by telephone to perform follow-up assessment. Verified name and  with patient as identifiers. Patient has following risk factors of: htn. Symptoms reviewed with patient who verbalized the following symptoms: fatigue, no new symptoms, no worsening symptoms and SYMPTOMS ARE IMPROVING. Due to no new or worsening symptoms encounter was not routed to provider for escalation. Educated patient about risk for severe COVID-19 due to risk factors according to CDC guidelines. ACM reviewed discharge instructions, medical action plan and red flag symptoms with the patient who verbalized understanding. Discussed COVID vaccination status: Yes and UNVACCINATED. Education provided on COVID-19 vaccination as appropriate. Discussed exposure protocols and quarantine with CDC Guidelines. Patient was given an opportunity to verbalize any questions and concerns and agrees to contact ACM or health care provider for questions related to their healthcare. Was patient discharged with a pulse oximeter? No Discussed and confirmed pulse oximeter discharge instructions and when to notify provider or seek emergency care. Augusto Aolnso has an appointment today for monoclonal antibody infusion therapy. ACM provided contact information. Plan for follow-up call in 3-5 days based on severity of symptoms and risk factors.

## 2021-11-05 ENCOUNTER — CARE COORDINATION (OUTPATIENT)
Dept: CARE COORDINATION | Age: 71
End: 2021-11-05

## 2021-11-05 NOTE — CARE COORDINATION
You Patient resolved from the Care Transitions episode on 11/5/2021  Discussed COVID-19 related testing which was available at this time. Test results were positive. Patient informed of results, if available? Yes    Patient/family has been provided the following resources and education related to COVID-19:                         Signs, symptoms and red flags related to COVID-19            [facility] exposure and quarantine guidelines            [employer] exposure contact - [phone]            Contact for their local Department of Health                 Patient currently reports that the following symptoms have improved:  fever, pain or aching joints, cough, shortness of breath, confusion or unusual change in mental status, chills or shaking, sweating, fast heart rate, fast breathing, dizziness/lightheadedness, less urine output, cold, clammy, and pale skin, low body temperature and no new/worsening symptoms     No further outreach scheduled with this CTN/ACM. Episode of Care resolved. Patient has this CTN/ACM contact information if future needs arise.

## 2021-11-08 DIAGNOSIS — G40.A09 ABSENCE SEIZURE (HCC): ICD-10-CM

## 2021-11-08 LAB
ALBUMIN SERPL-MCNC: 4 G/DL (ref 3.5–4.6)
ALP BLD-CCNC: 61 U/L (ref 40–130)
ALT SERPL-CCNC: 52 U/L (ref 0–33)
AST SERPL-CCNC: 16 U/L (ref 0–35)
BASOPHILS ABSOLUTE: 0 K/UL (ref 0–0.2)
BASOPHILS RELATIVE PERCENT: 0.2 %
BILIRUB SERPL-MCNC: 1.6 MG/DL (ref 0.2–0.7)
BILIRUBIN DIRECT: 0.3 MG/DL (ref 0–0.4)
BILIRUBIN, INDIRECT: 1.3 MG/DL (ref 0–0.6)
EOSINOPHILS ABSOLUTE: 0 K/UL (ref 0–0.7)
EOSINOPHILS RELATIVE PERCENT: 0 %
HCT VFR BLD CALC: 51.9 % (ref 37–47)
HEMOGLOBIN: 17 G/DL (ref 12–16)
LYMPHOCYTES ABSOLUTE: 1.2 K/UL (ref 1–4.8)
LYMPHOCYTES RELATIVE PERCENT: 8 %
MCH RBC QN AUTO: 28.6 PG (ref 27–31.3)
MCHC RBC AUTO-ENTMCNC: 32.7 % (ref 33–37)
MCV RBC AUTO: 87.4 FL (ref 82–100)
MONOCYTES ABSOLUTE: 1.1 K/UL (ref 0.2–0.8)
MONOCYTES RELATIVE PERCENT: 6.8 %
NEUTROPHILS ABSOLUTE: 13.3 K/UL (ref 1.4–6.5)
NEUTROPHILS RELATIVE PERCENT: 85 %
PDW BLD-RTO: 13.8 % (ref 11.5–14.5)
PLATELET # BLD: 278 K/UL (ref 130–400)
RBC # BLD: 5.94 M/UL (ref 4.2–5.4)
TOTAL PROTEIN: 6.3 G/DL (ref 6.3–8)
WBC # BLD: 15.6 K/UL (ref 4.8–10.8)

## 2021-11-11 PROBLEM — Z53.20 MAMMOGRAM DECLINED: Status: ACTIVE | Noted: 2021-11-11

## 2021-11-11 PROBLEM — R00.2 PALPITATIONS: Status: ACTIVE | Noted: 2021-11-11

## 2021-11-11 PROBLEM — R06.02 SHORTNESS OF BREATH: Status: ACTIVE | Noted: 2021-06-14

## 2021-11-11 PROBLEM — E66.01 SEVERE OBESITY (HCC): Status: ACTIVE | Noted: 2021-11-11

## 2021-11-12 ENCOUNTER — OFFICE VISIT (OUTPATIENT)
Dept: NEUROLOGY | Age: 71
End: 2021-11-12
Payer: MEDICARE

## 2021-11-12 VITALS
HEART RATE: 48 BPM | SYSTOLIC BLOOD PRESSURE: 128 MMHG | WEIGHT: 254 LBS | BODY MASS INDEX: 44.99 KG/M2 | DIASTOLIC BLOOD PRESSURE: 76 MMHG

## 2021-11-12 DIAGNOSIS — G40.A09 ABSENCE SEIZURE (HCC): ICD-10-CM

## 2021-11-12 DIAGNOSIS — R56.9 PARTIAL SEIZURES (HCC): Primary | ICD-10-CM

## 2021-11-12 DIAGNOSIS — R00.1 BRADYCARDIA: ICD-10-CM

## 2021-11-12 DIAGNOSIS — M25.50 ARTHRALGIA OF MULTIPLE JOINTS: ICD-10-CM

## 2021-11-12 PROCEDURE — 99214 OFFICE O/P EST MOD 30 MIN: CPT | Performed by: PSYCHIATRY & NEUROLOGY

## 2021-11-12 PROCEDURE — G8427 DOCREV CUR MEDS BY ELIG CLIN: HCPCS | Performed by: PSYCHIATRY & NEUROLOGY

## 2021-11-12 PROCEDURE — 1123F ACP DISCUSS/DSCN MKR DOCD: CPT | Performed by: PSYCHIATRY & NEUROLOGY

## 2021-11-12 PROCEDURE — 4040F PNEUMOC VAC/ADMIN/RCVD: CPT | Performed by: PSYCHIATRY & NEUROLOGY

## 2021-11-12 PROCEDURE — 1090F PRES/ABSN URINE INCON ASSESS: CPT | Performed by: PSYCHIATRY & NEUROLOGY

## 2021-11-12 PROCEDURE — 3017F COLORECTAL CA SCREEN DOC REV: CPT | Performed by: PSYCHIATRY & NEUROLOGY

## 2021-11-12 PROCEDURE — 1036F TOBACCO NON-USER: CPT | Performed by: PSYCHIATRY & NEUROLOGY

## 2021-11-12 PROCEDURE — G8417 CALC BMI ABV UP PARAM F/U: HCPCS | Performed by: PSYCHIATRY & NEUROLOGY

## 2021-11-12 PROCEDURE — G8400 PT W/DXA NO RESULTS DOC: HCPCS | Performed by: PSYCHIATRY & NEUROLOGY

## 2021-11-12 PROCEDURE — G8484 FLU IMMUNIZE NO ADMIN: HCPCS | Performed by: PSYCHIATRY & NEUROLOGY

## 2021-11-12 RX ORDER — SOTALOL HYDROCHLORIDE 240 MG/1
TABLET ORAL
COMMUNITY
Start: 2021-09-28

## 2021-11-12 RX ORDER — ESCITALOPRAM OXALATE 10 MG/1
TABLET ORAL
COMMUNITY
Start: 2021-10-28

## 2021-11-12 ASSESSMENT — ENCOUNTER SYMPTOMS
VOMITING: 0
NAUSEA: 0
COLOR CHANGE: 0
SHORTNESS OF BREATH: 0
TROUBLE SWALLOWING: 0
CHOKING: 0
BACK PAIN: 0
PHOTOPHOBIA: 0

## 2021-11-12 NOTE — PROGRESS NOTES
Subjective:      Patient ID: Katerine Jaeger is a 66 y.o. female who presents today for:  Chief Complaint   Patient presents with    Follow-up     Pt states that she has only been taking 50mg twice daily of lamictal, she says taking the 100mg caused her depression issues. But she has been doin ggood, no seizures or issues. HPI 76-year-old right-handed female with a history of absence seizure's. Patient was on oxcarbazepine and we changed her to Lamictal.  Patient was recommended to increase her 200 mg twice a day but she was not able to do this as she felt depressed with this this is likely a side effect. She is only on 50 mg twice a day doing good without any side effects and she does not have any dizzy spells. She is somewhat bradycardic though asymptomatic. Patient is on sotalol and may require titration if her pulse remains that low  He is aware of the low pulse rate and her cardiologist also is aware. Past Medical History:   Diagnosis Date    Depression     Hypertension      Past Surgical History:   Procedure Laterality Date    JOINT REPLACEMENT      right hip    JOINT REPLACEMENT      right knee     Social History     Socioeconomic History    Marital status:      Spouse name: Not on file    Number of children: Not on file    Years of education: Not on file    Highest education level: Not on file   Occupational History    Not on file   Tobacco Use    Smoking status: Former Smoker     Start date: 6/7/1968    Smokeless tobacco: Never Used   Substance and Sexual Activity    Alcohol use:  Yes     Alcohol/week: 6.0 standard drinks     Types: 6 Glasses of wine per week    Drug use: No    Sexual activity: Not on file   Other Topics Concern    Not on file   Social History Narrative    Not on file     Social Determinants of Health     Financial Resource Strain:     Difficulty of Paying Living Expenses: Not on file   Food Insecurity:     Worried About Running Out of Food in the Last Year: Not on file    Ran Out of Food in the Last Year: Not on file   Transportation Needs:     Lack of Transportation (Medical): Not on file    Lack of Transportation (Non-Medical):  Not on file   Physical Activity:     Days of Exercise per Week: Not on file    Minutes of Exercise per Session: Not on file   Stress:     Feeling of Stress : Not on file   Social Connections:     Frequency of Communication with Friends and Family: Not on file    Frequency of Social Gatherings with Friends and Family: Not on file    Attends [employer] Services: Not on file    Active Member of [address] [place] [address] [employer] or Organizations: Not on file    Attends Club or Organization Meetings: Not on file    Marital Status: Not on file   Intimate Partner Violence:     Fear of Current or Ex-Partner: Not on file    Emotionally Abused: Not on file    Physically Abused: Not on file    Sexually Abused: Not on file   Housing Stability:     Unable to Pay for Housing in the Last Year: Not on file    Number of [place] in the Last Year: Not on file    Unstable Housing in the Last Year: Not on file     Family History   Problem Relation Age of Onset    Heart Disease Mother     Diabetes Mother     Arthritis Father     Heart Disease Father     Cancer Father     High Blood Pressure Father      Allergies   Allergen Reactions    Gabapentin Diarrhea and Other (See Comments)     diarrhea    Adhesive Tape     Metoprolol Other (See Comments)    Naproxen Diarrhea    Oxcarbazepine Other (See Comments)       Current Outpatient Medications   Medication Sig Dispense Refill    escitalopram (LEXAPRO) 10 MG tablet take 1 tablet by mouth once daily      sotalol (BETAPACE) 240 MG tablet TAKE 1 TABLET BY MOUTH TWICE A DAY      acetaminophen (TYLENOL) 500 MG tablet Take 1 tablet by mouth 4 times daily as needed for Pain 120 tablet 0    lamoTRIgine (LAMICTAL) 100 MG tablet 1 twice a day starting on 23 June 60 tablet 3    apixaban (ELIQUIS) 5 MG TABS tablet Deep Tendon Reflexes: Reflexes are normal and symmetric. Babinski sign absent on the right side. Babinski sign absent on the left side. Psychiatric:         Mood and Affect: Mood normal.         No results found. Lab Results   Component Value Date    WBC 15.6 11/08/2021    RBC 5.94 11/08/2021    HGB 17.0 11/08/2021    HCT 51.9 11/08/2021    MCV 87.4 11/08/2021    MCH 28.6 11/08/2021    MCHC 32.7 11/08/2021    RDW 13.8 11/08/2021     11/08/2021    MPV 9.0 03/24/2014     Lab Results   Component Value Date     10/29/2021    K 4.8 10/29/2021     10/29/2021    CO2 26 10/29/2021    BUN 10 10/29/2021    CREATININE 0.9 10/29/2021    CREATININE 0.88 10/29/2021    GFRAA >60 10/29/2021    LABGLOM >60 10/29/2021    GLUCOSE 90 10/29/2021    PROT 6.3 11/08/2021    LABALBU 4.0 11/08/2021    CALCIUM 9.2 10/29/2021    BILITOT 1.6 11/08/2021    ALKPHOS 61 11/08/2021    AST 16 11/08/2021    ALT 52 11/08/2021     Lab Results   Component Value Date    PROTIME 10.1 03/27/2017    INR 0.9 03/27/2017     Lab Results   Component Value Date    TSH 1.780 01/09/2019     Lab Results   Component Value Date    TRIG 110 03/28/2017    HDL 54 03/28/2017    LDLCALC 101 03/28/2017     Lab Results   Component Value Date    LABAMPH Neg 12/18/2020    BARBSCNU Neg 12/18/2020    LABBENZ Neg 12/18/2020    LABMETH Neg 12/18/2020    OPIATESCREENURINE Neg 12/18/2020    PHENCYCLIDINESCREENURINE Neg 12/18/2020    ETOH <10 12/18/2020     No results found for: LITHIUM, DILFRTOT, VALPROATE    Assessment:       Diagnosis Orders   1. Partial seizures ([address]     2. Absence seizure ([address]     3. Arthralgia of multiple joints     4. Bradycardia     Partial seizures without any recurrence.   Patient actually was on Trileptal with side effects and therefore we had changed to Lamictal.  Our plan was to increase the Lamictal 200 mg twice a day which were not able to do due to side effects of depression which is seen with Lamictal.  She is only on 50 mg twice a day which we will continue unless she has any breakthrough seizures. If she does then we may need to increase it by another 50 mg. Patient is bradycardic but not symptomatic. She is aware of this and her cardiologist aware of this as well. Plan:      No orders of the defined types were placed in this encounter. No orders of the defined types were placed in this encounter. No follow-ups on file.       Sayra Lucas MD

## 2021-11-13 LAB — LAMOTRIGINE LEVEL: 2.5 UG/ML (ref 2.5–15)

## 2022-01-31 RX ORDER — LAMOTRIGINE 100 MG/1
100 TABLET ORAL 2 TIMES DAILY
Qty: 60 TABLET | Refills: 3 | Status: SHIPPED | OUTPATIENT
Start: 2022-01-31 | End: 2022-08-08 | Stop reason: SDUPTHER

## 2022-01-31 NOTE — TELEPHONE ENCOUNTER
Pharmacy is requesting medication refill.  Please approve or deny this request.    Rx requested:  Requested Prescriptions     Pending Prescriptions Disp Refills    lamoTRIgine (LAMICTAL) 100 MG tablet 60 tablet 3     Sig: Take 1 tablet by mouth 2 times daily         Last Office Visit:   11/12/2021      Next Visit Date:  Future Appointments   Date Time Provider Jazmine Sandhu   5/13/2022  9:00 AM Dain Carballo MD [facility]

## 2022-05-12 PROBLEM — S00.03XA CONTUSION OF SCALP: Status: ACTIVE | Noted: 2022-05-12

## 2022-05-13 ENCOUNTER — OFFICE VISIT (OUTPATIENT)
Dept: NEUROLOGY | Age: 72
End: 2022-05-13
Payer: MEDICARE

## 2022-05-13 VITALS
SYSTOLIC BLOOD PRESSURE: 122 MMHG | HEART RATE: 64 BPM | DIASTOLIC BLOOD PRESSURE: 64 MMHG | BODY MASS INDEX: 47.97 KG/M2 | WEIGHT: 270.8 LBS

## 2022-05-13 DIAGNOSIS — R00.1 BRADYCARDIA: ICD-10-CM

## 2022-05-13 DIAGNOSIS — R56.9 PARTIAL SEIZURES (HCC): Primary | ICD-10-CM

## 2022-05-13 DIAGNOSIS — G45.9 TIA (TRANSIENT ISCHEMIC ATTACK): ICD-10-CM

## 2022-05-13 DIAGNOSIS — G40.A09 ABSENCE SEIZURE (HCC): ICD-10-CM

## 2022-05-13 PROCEDURE — 99213 OFFICE O/P EST LOW 20 MIN: CPT | Performed by: PSYCHIATRY & NEUROLOGY

## 2022-05-13 PROCEDURE — 1036F TOBACCO NON-USER: CPT | Performed by: PSYCHIATRY & NEUROLOGY

## 2022-05-13 PROCEDURE — 1090F PRES/ABSN URINE INCON ASSESS: CPT | Performed by: PSYCHIATRY & NEUROLOGY

## 2022-05-13 PROCEDURE — 1123F ACP DISCUSS/DSCN MKR DOCD: CPT | Performed by: PSYCHIATRY & NEUROLOGY

## 2022-05-13 PROCEDURE — 4040F PNEUMOC VAC/ADMIN/RCVD: CPT | Performed by: PSYCHIATRY & NEUROLOGY

## 2022-05-13 PROCEDURE — G8400 PT W/DXA NO RESULTS DOC: HCPCS | Performed by: PSYCHIATRY & NEUROLOGY

## 2022-05-13 PROCEDURE — G8417 CALC BMI ABV UP PARAM F/U: HCPCS | Performed by: PSYCHIATRY & NEUROLOGY

## 2022-05-13 PROCEDURE — 3017F COLORECTAL CA SCREEN DOC REV: CPT | Performed by: PSYCHIATRY & NEUROLOGY

## 2022-05-13 PROCEDURE — G8427 DOCREV CUR MEDS BY ELIG CLIN: HCPCS | Performed by: PSYCHIATRY & NEUROLOGY

## 2022-05-13 RX ORDER — ALBUTEROL SULFATE 90 UG/1
AEROSOL, METERED RESPIRATORY (INHALATION)
COMMUNITY
Start: 2019-07-31

## 2022-05-13 ASSESSMENT — ENCOUNTER SYMPTOMS
CHOKING: 0
BACK PAIN: 0
NAUSEA: 0
SHORTNESS OF BREATH: 0
COLOR CHANGE: 0
VOMITING: 0
PHOTOPHOBIA: 0
TROUBLE SWALLOWING: 0

## 2022-05-13 NOTE — PROGRESS NOTES
Subjective:      Patient ID: Cornelia Herrera is a 74 y.o. female who presents today for:  Chief Complaint   Patient presents with    Follow-up     Pt states that she is doing good and no recent seizures. HPI 74 right-handed female with history of appears partial seizures. Patient is on Lamictal.  Patient is on 100 mg twice a day and doing well. Patient denies any side effects and has not had any breakthrough seizures. Patient walks with a cane due to her knee issues and arthritis she has joint swelling but this is from arthritis. She sleeps well. Past Medical History:   Diagnosis Date    Depression     Hypertension      Past Surgical History:   Procedure Laterality Date    JOINT REPLACEMENT      right hip    JOINT REPLACEMENT      right knee     Social History     Socioeconomic History    Marital status:      Spouse name: Not on file    Number of children: Not on file    Years of education: Not on file    Highest education level: Not on file   Occupational History    Not on file   Tobacco Use    Smoking status: Former Smoker     Start date: 6/7/1968    Smokeless tobacco: Never Used   Substance and Sexual Activity    Alcohol use: Yes     Alcohol/week: 6.0 standard drinks     Types: 6 Glasses of wine per week    Drug use: No    Sexual activity: Not on file   Other Topics Concern    Not on file   Social History Narrative    Not on file     Social Determinants of Health     Financial Resource Strain:     Difficulty of Paying Living Expenses: Not on file   Food Insecurity:     Worried About Running Out of Food in the Last Year: Not on file    Paula of Food in the Last Year: Not on file   Transportation Needs:     Lack of Transportation (Medical): Not on file    Lack of Transportation (Non-Medical):  Not on file   Physical Activity:     Days of Exercise per Week: Not on file    Minutes of Exercise per Session: Not on file   Stress:     Feeling of Stress : Not on file pain, tinnitus and trouble swallowing. Eyes: Negative for photophobia and visual disturbance. Respiratory: Negative for choking and shortness of breath. Cardiovascular: Negative for chest pain and palpitations. Gastrointestinal: Negative for nausea and vomiting. Musculoskeletal: Negative for back pain, gait problem, joint swelling, myalgias, neck pain and neck stiffness. Skin: Negative for color change. Allergic/Immunologic: Negative for food allergies. Neurological: Negative for dizziness, tremors, seizures, syncope, facial asymmetry, speech difficulty, weakness, light-headedness, numbness and headaches. Psychiatric/Behavioral: Negative for behavioral problems, confusion, hallucinations and sleep disturbance. Objective:   /64 (Site: Left Upper Arm, Position: Sitting, Cuff Size: Medium Adult)   Pulse 64   Wt 270 lb 12.8 oz (122.8 kg)   LMP  (LMP Unknown)   BMI 47.97 kg/m²     Physical Exam  Vitals reviewed. Eyes:      Pupils: Pupils are equal, round, and reactive to light. Cardiovascular:      Rate and Rhythm: Normal rate and regular rhythm. Heart sounds: No murmur heard. Pulmonary:      Effort: Pulmonary effort is normal.      Breath sounds: Normal breath sounds. Abdominal:      General: Bowel sounds are normal.   Musculoskeletal:         General: Normal range of motion. Cervical back: Normal range of motion. Skin:     General: Skin is warm. Neurological:      Mental Status: She is alert and oriented to person, place, and time. Cranial Nerves: No cranial nerve deficit. Sensory: No sensory deficit. Motor: No abnormal muscle tone. Coordination: Coordination normal.      Deep Tendon Reflexes: Reflexes are normal and symmetric. Babinski sign absent on the right side. Babinski sign absent on the left side. Psychiatric:         Mood and Affect: Mood normal.       Absent reflex notable in the lower extremity with some swelling.   No results found. Lab Results   Component Value Date    WBC 15.6 11/08/2021    RBC 5.94 11/08/2021    HGB 17.0 11/08/2021    HCT 51.9 11/08/2021    MCV 87.4 11/08/2021    MCH 28.6 11/08/2021    MCHC 32.7 11/08/2021    RDW 13.8 11/08/2021     11/08/2021    MPV 9.0 03/24/2014     Lab Results   Component Value Date     10/29/2021    K 4.8 10/29/2021     10/29/2021    CO2 26 10/29/2021    BUN 10 10/29/2021    CREATININE 0.9 10/29/2021    CREATININE 0.88 10/29/2021    GFRAA >60 10/29/2021    LABGLOM >60 10/29/2021    GLUCOSE 90 10/29/2021    PROT 6.3 11/08/2021    LABALBU 4.0 11/08/2021    CALCIUM 9.2 10/29/2021    BILITOT 1.6 11/08/2021    ALKPHOS 61 11/08/2021    AST 16 11/08/2021    ALT 52 11/08/2021     Lab Results   Component Value Date    PROTIME 10.1 03/27/2017    INR 0.9 03/27/2017     Lab Results   Component Value Date    TSH 1.780 01/09/2019     Lab Results   Component Value Date    TRIG 110 03/28/2017    HDL 54 03/28/2017    LDLCALC 101 03/28/2017     Lab Results   Component Value Date    LABAMPH Neg 12/18/2020    BARBSCNU Neg 12/18/2020    LABBENZ Neg 12/18/2020    LABMETH Neg 12/18/2020    OPIATESCREENURINE Neg 12/18/2020    PHENCYCLIDINESCREENURINE Neg 12/18/2020    ETOH <10 12/18/2020   Need an MRI done that he is allergic to it so  No results found for: LITHIUM, DILFRTOT, VALPROATE    Assessment:       Diagnosis Orders   1. Partial seizures ([address]     2. TIA (transient ischemic attack)     3. Bradycardia     4. Absence seizure (HCC)     Partial absence seizure return well-controlled with Lamictal 100 mg twice a day. Patient has not any seizures at this dose and therefore we have kept her at the same dose. Patient's last lamotrigine level was 2.5 and borderline and we had continue to discuss increasing the medication though she had side effects of the same which is mostly psychiatric and therefore we have kept at the lowest dose and clinically apparently she is responding to the dose.   He has not any side effects and we have not recommended any changes and we will see her in 6 months  Patient's gait issues appear to be secondary to her arthritis      Plan:      No orders of the defined types were placed in this encounter. No orders of the defined types were placed in this encounter. No follow-ups on file.       Kathy Hall MD

## 2022-07-28 NOTE — TELEPHONE ENCOUNTER
Discussed results with mom. Mom will call back to schedule observed [place] challenge.   Per telephone encounter

## 2022-08-08 RX ORDER — LAMOTRIGINE 100 MG/1
100 TABLET ORAL 2 TIMES DAILY
Qty: 60 TABLET | Refills: 3 | Status: SHIPPED | OUTPATIENT
Start: 2022-08-08

## 2022-08-08 NOTE — TELEPHONE ENCOUNTER
Fer Landry is requesting a refill on the following medication(s):  Requested Prescriptions     Pending Prescriptions Disp Refills    lamoTRIgine (LAMICTAL) 100 MG tablet 60 tablet 3     Sig: Take 1 tablet by mouth in the morning and 1 tablet before bedtime.        Last Visit Date (If Applicable):  Visit date not found    Next Visit Date:    Visit date not found

## 2022-11-11 ENCOUNTER — OFFICE VISIT (OUTPATIENT)
Dept: NEUROLOGY | Age: 72
End: 2022-11-11
Payer: MEDICARE

## 2022-11-11 VITALS
WEIGHT: 281.6 LBS | TEMPERATURE: 97.4 F | SYSTOLIC BLOOD PRESSURE: 120 MMHG | DIASTOLIC BLOOD PRESSURE: 86 MMHG | HEART RATE: 58 BPM | BODY MASS INDEX: 49.89 KG/M2 | HEIGHT: 63 IN | OXYGEN SATURATION: 96 %

## 2022-11-11 DIAGNOSIS — R56.9 PARTIAL SEIZURES (HCC): Primary | ICD-10-CM

## 2022-11-11 DIAGNOSIS — R27.0 ATAXIA: ICD-10-CM

## 2022-11-11 DIAGNOSIS — R00.1 BRADYCARDIA: ICD-10-CM

## 2022-11-11 DIAGNOSIS — R56.9 PARTIAL SEIZURES (HCC): ICD-10-CM

## 2022-11-11 LAB
ALBUMIN SERPL-MCNC: 4.1 G/DL (ref 3.5–4.6)
ALP BLD-CCNC: 73 U/L (ref 40–130)
ALT SERPL-CCNC: 11 U/L (ref 0–33)
AST SERPL-CCNC: 18 U/L (ref 0–35)
BILIRUB SERPL-MCNC: 0.6 MG/DL (ref 0.2–0.7)
BILIRUBIN DIRECT: <0.2 MG/DL (ref 0–0.4)
BILIRUBIN, INDIRECT: NORMAL MG/DL (ref 0–0.6)
REASON FOR REJECTION: NORMAL
REJECTED TEST: NORMAL
TOTAL PROTEIN: 6.5 G/DL (ref 6.3–8)

## 2022-11-11 PROCEDURE — 1036F TOBACCO NON-USER: CPT | Performed by: PSYCHIATRY & NEUROLOGY

## 2022-11-11 PROCEDURE — 99214 OFFICE O/P EST MOD 30 MIN: CPT | Performed by: PSYCHIATRY & NEUROLOGY

## 2022-11-11 PROCEDURE — G8400 PT W/DXA NO RESULTS DOC: HCPCS | Performed by: PSYCHIATRY & NEUROLOGY

## 2022-11-11 PROCEDURE — 1090F PRES/ABSN URINE INCON ASSESS: CPT | Performed by: PSYCHIATRY & NEUROLOGY

## 2022-11-11 PROCEDURE — G8427 DOCREV CUR MEDS BY ELIG CLIN: HCPCS | Performed by: PSYCHIATRY & NEUROLOGY

## 2022-11-11 PROCEDURE — 1123F ACP DISCUSS/DSCN MKR DOCD: CPT | Performed by: PSYCHIATRY & NEUROLOGY

## 2022-11-11 PROCEDURE — 3074F SYST BP LT 130 MM HG: CPT | Performed by: PSYCHIATRY & NEUROLOGY

## 2022-11-11 PROCEDURE — 3017F COLORECTAL CA SCREEN DOC REV: CPT | Performed by: PSYCHIATRY & NEUROLOGY

## 2022-11-11 PROCEDURE — G8484 FLU IMMUNIZE NO ADMIN: HCPCS | Performed by: PSYCHIATRY & NEUROLOGY

## 2022-11-11 PROCEDURE — G8417 CALC BMI ABV UP PARAM F/U: HCPCS | Performed by: PSYCHIATRY & NEUROLOGY

## 2022-11-11 PROCEDURE — 3078F DIAST BP <80 MM HG: CPT | Performed by: PSYCHIATRY & NEUROLOGY

## 2022-11-11 ASSESSMENT — ENCOUNTER SYMPTOMS
BACK PAIN: 0
TROUBLE SWALLOWING: 0
CHOKING: 0
PHOTOPHOBIA: 0
NAUSEA: 0
COLOR CHANGE: 0
VOMITING: 0
SHORTNESS OF BREATH: 0

## 2022-11-11 ASSESSMENT — PATIENT HEALTH QUESTIONNAIRE - PHQ9
SUM OF ALL RESPONSES TO PHQ9 QUESTIONS 1 & 2: 0
7. TROUBLE CONCENTRATING ON THINGS, SUCH AS READING THE NEWSPAPER OR WATCHING TELEVISION: 0
1. LITTLE INTEREST OR PLEASURE IN DOING THINGS: 0
10. IF YOU CHECKED OFF ANY PROBLEMS, HOW DIFFICULT HAVE THESE PROBLEMS MADE IT FOR YOU TO DO YOUR WORK, TAKE CARE OF THINGS AT HOME, OR GET ALONG WITH OTHER PEOPLE: 0
2. FEELING DOWN, DEPRESSED OR HOPELESS: 0
SUM OF ALL RESPONSES TO PHQ QUESTIONS 1-9: 0
4. FEELING TIRED OR HAVING LITTLE ENERGY: 0
3. TROUBLE FALLING OR STAYING ASLEEP: 0
SUM OF ALL RESPONSES TO PHQ QUESTIONS 1-9: 0
8. MOVING OR SPEAKING SO SLOWLY THAT OTHER PEOPLE COULD HAVE NOTICED. OR THE OPPOSITE, BEING SO FIGETY OR RESTLESS THAT YOU HAVE BEEN MOVING AROUND A LOT MORE THAN USUAL: 0
6. FEELING BAD ABOUT YOURSELF - OR THAT YOU ARE A FAILURE OR HAVE LET YOURSELF OR YOUR FAMILY DOWN: 0
5. POOR APPETITE OR OVEREATING: 0
9. THOUGHTS THAT YOU WOULD BE BETTER OFF DEAD, OR OF HURTING YOURSELF: 0

## 2022-11-11 NOTE — PROGRESS NOTES
Subjective:      Patient ID: Shawanda Arango is a 74 y.o. female who presents today for:  Chief Complaint   Patient presents with    Seizures     6 month follow up. Has had one seizure in the summer ( or July), did not seek medical care afterwards. Patient was sitting in chair and  and daughter found her and that it didn't seem too bad. Patient is comfortable with medication. Patient states everything has been well since seizure to today's visit. HPI 74 right-handed female with a history of seizures. Patient continues on Lamictal 100 mg 1 in the morning 1 at bedtime without any recurrent seizures patient's last seizure was many years ago she broke to a seizure in  just after right seen her. Close questioning it did appear that she was only taking 100 mg and not the dose that she was prescribed and likely that was the reason. She had any further seizures. She is not any further levels either. Patient does have sleep apnea but does not use CPAP machine this may be another factor that may add up she walks with a cane due to knee issues and arthritis  She last lamotrigine level was 2.5 normal hepatic function tests    She has not any falls injuries or trauma  Past Medical History:   Diagnosis Date    Depression     Hypertension      Past Surgical History:   Procedure Laterality Date    JOINT REPLACEMENT      right hip    JOINT REPLACEMENT      right knee     Social History     Socioeconomic History    Marital status:      Spouse name: Not on file    Number of children: Not on file    Years of education: Not on file    Highest education level: Not on file   Occupational History    Not on file   Tobacco Use    Smoking status: Former     Packs/day: 0.50     Years: 1.00     Pack years: 0.50     Types: Cigarettes     Start date: 1968     Quit date: 1968     Years since quittin.9    Smokeless tobacco: Never   Substance and Sexual Activity    Alcohol use:  Yes     Alcohol/week: 6.0 standard drinks     Types: 6 Glasses of wine per week    Drug use: No    Sexual activity: Not on file   Other Topics Concern    Not on file   Social History Narrative    Not on file     Social Determinants of Health     Financial Resource Strain: Not on file   Food Insecurity: Not on file   Transportation Needs: Not on file   Physical Activity: Not on file   Stress: Not on file   Social Connections: Not on file   Intimate Partner Violence: Not on file   Housing Stability: Not on file     Family History   Problem Relation Age of Onset    Heart Disease Mother     Diabetes Mother     Arthritis Father     Heart Disease Father     Cancer Father     High Blood Pressure Father      Allergies   Allergen Reactions    Gabapentin Diarrhea and Other (See Comments)     diarrhea    Adhesive Tape     Metoprolol Other (See Comments)    Naproxen Diarrhea    Oxcarbazepine Other (See Comments)       Current Outpatient Medications   Medication Sig Dispense Refill    lamoTRIgine (LAMICTAL) 100 MG tablet Take 1 tablet by mouth in the morning and 1 tablet before bedtime. 60 tablet 3    albuterol sulfate  (90 Base) MCG/ACT inhaler Inhale into the lungs      escitalopram (LEXAPRO) 10 MG tablet take 1 tablet by mouth once daily      sotalol (BETAPACE) 240 MG tablet TAKE 1 TABLET BY MOUTH TWICE A DAY      acetaminophen (TYLENOL) 500 MG tablet Take 1 tablet by mouth 4 times daily as needed for Pain 120 tablet 0    apixaban (ELIQUIS) 5 MG TABS tablet Take 5 mg by mouth 2 times daily Is to restart today      furosemide (LASIX) 40 MG tablet Take 40 mg by mouth daily       No current facility-administered medications for this visit. Review of Systems   Constitutional:  Negative for fever. HENT:  Negative for ear pain, tinnitus and trouble swallowing. Eyes:  Negative for photophobia and visual disturbance. Respiratory:  Negative for choking and shortness of breath.     Cardiovascular:  Negative for chest pain and palpitations. Gastrointestinal:  Negative for nausea and vomiting. Musculoskeletal:  Positive for gait problem. Negative for back pain, joint swelling, myalgias, neck pain and neck stiffness. Skin:  Negative for color change. Allergic/Immunologic: Negative for food allergies. Neurological:  Positive for seizures. Negative for dizziness, tremors, syncope, facial asymmetry, speech difficulty, weakness, light-headedness, numbness and headaches. Psychiatric/Behavioral:  Negative for behavioral problems, confusion, hallucinations and sleep disturbance. Objective:   /86 (Site: Left Lower Arm, Position: Sitting, Cuff Size: Large Adult)   Pulse 58   Temp 97.4 °F (36.3 °C) (Temporal)   Ht 5' 2.5\" (1.588 m)   Wt 281 lb 9.6 oz (127.7 kg)   LMP  (LMP Unknown)   SpO2 96%   BMI 50.68 kg/m²     Physical Exam  Vitals reviewed. Eyes:      Pupils: Pupils are equal, round, and reactive to light. Cardiovascular:      Rate and Rhythm: Normal rate and regular rhythm. Heart sounds: No murmur heard. Pulmonary:      Effort: Pulmonary effort is normal.      Breath sounds: Normal breath sounds. Abdominal:      General: Bowel sounds are normal.   Musculoskeletal:         General: Normal range of motion. Cervical back: Normal range of motion. Skin:     General: Skin is warm. Neurological:      Mental Status: She is alert and oriented to person, place, and time. Cranial Nerves: No cranial nerve deficit. Sensory: No sensory deficit. Motor: No abnormal muscle tone. Coordination: Coordination normal.      Deep Tendon Reflexes: Reflexes are normal and symmetric. Babinski sign absent on the right side. Babinski sign absent on the left side. Psychiatric:         Mood and Affect: Mood normal.   She has large knees with areflexia in lower extremities  Gait ataxia    No results found.     Lab Results   Component Value Date/Time    WBC 15.6 11/08/2021 02:56 PM    RBC 5.94 11/08/2021 02:56 PM    HGB 17.0 11/08/2021 02:56 PM    HCT 51.9 11/08/2021 02:56 PM    MCV 87.4 11/08/2021 02:56 PM    MCH 28.6 11/08/2021 02:56 PM    MCHC 32.7 11/08/2021 02:56 PM    RDW 13.8 11/08/2021 02:56 PM     11/08/2021 02:56 PM    MPV 9.0 03/24/2014 06:10 AM     Lab Results   Component Value Date/Time     10/29/2021 02:15 PM    K 4.8 10/29/2021 02:15 PM     10/29/2021 02:15 PM    CO2 26 10/29/2021 02:15 PM    BUN 10 10/29/2021 02:15 PM    CREATININE 0.9 10/29/2021 03:02 PM    CREATININE 0.88 10/29/2021 02:15 PM    GFRAA >60 10/29/2021 03:02 PM    LABGLOM >60 10/29/2021 03:02 PM    GLUCOSE 90 10/29/2021 02:15 PM    PROT 6.3 11/08/2021 02:56 PM    LABALBU 4.0 11/08/2021 02:56 PM    CALCIUM 9.2 10/29/2021 02:15 PM    BILITOT 1.6 11/08/2021 02:56 PM    ALKPHOS 61 11/08/2021 02:56 PM    AST 16 11/08/2021 02:56 PM    ALT 52 11/08/2021 02:56 PM     Lab Results   Component Value Date/Time    PROTIME 10.1 03/27/2017 12:30 PM    INR 0.9 03/27/2017 12:30 PM     Lab Results   Component Value Date/Time    TSH 1.780 01/09/2019 02:34 PM     Lab Results   Component Value Date/Time    TRIG 110 03/28/2017 06:41 AM    HDL 54 03/28/2017 06:41 AM    LDLCALC 101 03/28/2017 06:41 AM     Lab Results   Component Value Date/Time    LABAMPH Neg 12/18/2020 07:30 AM    BARBSCNU Neg 12/18/2020 07:30 AM    LABBENZ Neg 12/18/2020 07:30 AM    LABMETH Neg 12/18/2020 07:30 AM    OPIATESCREENURINE Neg 12/18/2020 07:30 AM    PHENCYCLIDINESCREENURINE Neg 12/18/2020 07:30 AM    ETOH <10 12/18/2020 07:30 AM     No results found for: LITHIUM, DILFRTOT, VALPROATE    Assessment:       Diagnosis Orders   1. Partial seizures ([facility]        2. Bradycardia        3. Ataxia        Partial seizures with secondary generalization. Patient did not have a seizure for many years though she broke for seizure in June right after I seen her.   On close questioning it did appear that patient was taking only 100 mg of Lamictal instead of 100 mg twice a day and this she did on her own. She did realize that she is now taking 2 tablets. Her level when last seen was only 2.5 and may be this is lower for her. We will repeat her level today and see where she is and she may require higher dosing if she remains in that area. Compliance has been discussed and recommended that he continue medication as prescribed. Her main issues appears to be gait ataxia and she has large knees but no neuropathy is noted but no reflexes are seen either. She denies any back pain. Obstructive sleep apnea and is not on CPAP machine which may also add to expectation of occasional seizures    Lambert Alcaraz MD, [id number], American Board of Psychiatry & Neurology  Board Certified in Vascular Neurology  Board Certified in Neuromuscular Medicine  Certified in [facility]:      No orders of the defined types were placed in this encounter. No orders of the defined types were placed in this encounter. No follow-ups on file.       Alex Herrera MD

## 2022-12-12 RX ORDER — LAMOTRIGINE 100 MG/1
100 TABLET ORAL 2 TIMES DAILY
Qty: 60 TABLET | Refills: 3 | Status: SHIPPED | OUTPATIENT
Start: 2022-12-12

## 2022-12-12 NOTE — TELEPHONE ENCOUNTER
Pharmacy is requesting medication refill.  Please approve or deny this request.    Rx requested:  Requested Prescriptions     Pending Prescriptions Disp Refills    lamoTRIgine (LAMICTAL) 100 MG tablet 60 tablet 3     Sig: Take 1 tablet by mouth 2 times daily         Last Office Visit:   11/11/2022      Next Visit Date:  Future Appointments   Date Time Provider Jazmine Sandhu   5/12/2023  9:00 AM Lambert Robledo  [facility]

## 2023-04-03 RX ORDER — LAMOTRIGINE 100 MG/1
100 TABLET ORAL 2 TIMES DAILY
Qty: 60 TABLET | Refills: 3 | Status: SHIPPED | OUTPATIENT
Start: 2023-04-03

## 2023-04-03 NOTE — TELEPHONE ENCOUNTER
Pharmacy is requesting medication refill.  Please approve or deny this request.    Rx requested:  Requested Prescriptions     Pending Prescriptions Disp Refills    lamoTRIgine (LAMICTAL) 100 MG tablet 60 tablet 3     Sig: Take 1 tablet by mouth 2 times daily         Last Office Visit:   11/11/2022      Next Visit Date:  Future Appointments   Date Time Provider Jazmine Sandhu   5/12/2023  9:00 AM Lambert Schultz  [facility]

## 2023-08-08 RX ORDER — LAMOTRIGINE 100 MG/1
100 TABLET ORAL 2 TIMES DAILY
Qty: 60 TABLET | Refills: 3 | Status: SHIPPED | OUTPATIENT
Start: 2023-08-08

## 2023-08-08 NOTE — TELEPHONE ENCOUNTER
requesting medication refill.  Please approve or deny this request.    Rx requested:  Requested Prescriptions     Pending Prescriptions Disp Refills    lamoTRIgine (LAMICTAL) 100 MG tablet 60 tablet 3     Sig: Take 1 tablet by mouth 2 times daily         Last Office Visit:   11/11/2022      Next Visit Date:  Future Appointments   Date Time Provider [facility] [address]   10/6/2023 10:00 AM Lambert Garcia MD [facility] -

## 2023-09-22 PROBLEM — M25.50 ARTHRALGIA OF MULTIPLE SITES: Status: ACTIVE | Noted: 2023-09-22

## 2023-09-22 PROBLEM — I48.0 PAROXYSMAL ATRIAL FIBRILLATION (MULTI): Status: ACTIVE | Noted: 2023-09-22

## 2023-09-22 PROBLEM — R06.00 DYSPNEA: Status: ACTIVE | Noted: 2023-09-22

## 2023-09-22 PROBLEM — S00.03XA CONTUSION OF SCALP: Status: ACTIVE | Noted: 2023-09-22

## 2023-09-22 PROBLEM — N18.2 STAGE 2 CHRONIC KIDNEY DISEASE: Status: ACTIVE | Noted: 2023-09-22

## 2023-09-22 PROBLEM — G89.29 CHRONIC BACK PAIN: Status: ACTIVE | Noted: 2023-09-22

## 2023-09-22 PROBLEM — R00.2 PALPITATIONS: Status: ACTIVE | Noted: 2023-09-22

## 2023-09-22 PROBLEM — R60.0 PEDAL EDEMA: Status: ACTIVE | Noted: 2023-09-22

## 2023-09-22 PROBLEM — E55.9 VITAMIN D DEFICIENCY: Status: ACTIVE | Noted: 2023-09-22

## 2023-09-22 PROBLEM — E66.01 MORBID OBESITY WITH BMI OF 50.0-59.9, ADULT (MULTI): Status: ACTIVE | Noted: 2023-09-22

## 2023-09-22 PROBLEM — M54.9 CHRONIC BACK PAIN: Status: ACTIVE | Noted: 2023-09-22

## 2023-09-22 PROBLEM — R06.02 SHORTNESS OF BREATH: Status: ACTIVE | Noted: 2023-09-22

## 2023-09-22 PROBLEM — R94.30 ABNORMAL RESULTS OF CARDIOVASCULAR FUNCTION STUDIES: Status: ACTIVE | Noted: 2023-09-22

## 2023-09-22 PROBLEM — G40.A09 ABSENCE SEIZURE (MULTI): Status: ACTIVE | Noted: 2023-09-22

## 2023-09-22 PROBLEM — M17.12 PRIMARY OSTEOARTHRITIS OF LEFT KNEE: Status: ACTIVE | Noted: 2023-09-22

## 2023-09-22 PROBLEM — F32.A DEPRESSION: Status: ACTIVE | Noted: 2023-09-22

## 2023-09-22 PROBLEM — S33.5XXA LUMBAR SPRAIN: Status: ACTIVE | Noted: 2023-09-22

## 2023-09-22 PROBLEM — M43.17 SPONDYLOLISTHESIS OF LUMBOSACRAL REGION: Status: ACTIVE | Noted: 2023-09-22

## 2023-09-22 PROBLEM — L57.0 ACTINIC KERATOSIS: Status: ACTIVE | Noted: 2023-09-22

## 2023-09-22 PROBLEM — L81.4 OTHER MELANIN HYPERPIGMENTATION: Status: ACTIVE | Noted: 2018-05-23

## 2023-09-22 PROBLEM — Z87.891 FORMER SMOKER: Status: ACTIVE | Noted: 2023-09-22

## 2023-09-22 PROBLEM — E78.5 HYPERLIPIDEMIA: Status: ACTIVE | Noted: 2023-09-22

## 2023-09-22 PROBLEM — I27.20 PULMONARY HYPERTENSION (MULTI): Status: ACTIVE | Noted: 2023-09-22

## 2023-09-22 PROBLEM — D22.5 MELANOCYTIC NEVI OF TRUNK: Status: ACTIVE | Noted: 2018-05-23

## 2023-09-22 PROBLEM — I10 ESSENTIAL HYPERTENSION: Status: ACTIVE | Noted: 2023-09-22

## 2023-09-22 PROBLEM — D18.01 HEMANGIOMA OF SKIN AND SUBCUTANEOUS TISSUE: Status: ACTIVE | Noted: 2018-05-23

## 2023-09-22 PROBLEM — M16.12 PRIMARY OSTEOARTHRITIS OF LEFT HIP: Status: ACTIVE | Noted: 2023-09-22

## 2023-09-22 PROBLEM — Z79.899 HIGH RISK MEDICATION USE: Status: ACTIVE | Noted: 2023-09-22

## 2023-09-22 PROBLEM — I49.5 SINUS NODE DYSFUNCTION (MULTI): Status: ACTIVE | Noted: 2023-09-22

## 2023-09-22 PROBLEM — M12.9 ARTHROPATHY OF MULTIPLE SITES: Status: ACTIVE | Noted: 2023-09-22

## 2023-09-22 PROBLEM — I48.91 ATRIAL FIBRILLATION (MULTI): Status: ACTIVE | Noted: 2023-09-22

## 2023-09-22 PROBLEM — F34.1 PRIMARY DYSTHYMIA: Status: ACTIVE | Noted: 2023-09-22

## 2023-09-22 RX ORDER — ACETAMINOPHEN 500 MG
500 TABLET ORAL EVERY 4 HOURS PRN
COMMUNITY
Start: 2021-10-21

## 2023-09-22 RX ORDER — ALBUTEROL SULFATE 90 UG/1
2 AEROSOL, METERED RESPIRATORY (INHALATION) EVERY 6 HOURS
COMMUNITY
Start: 2019-07-31 | End: 2023-11-14 | Stop reason: SDUPTHER

## 2023-09-22 RX ORDER — OXCARBAZEPINE 300 MG/1
300 TABLET, FILM COATED ORAL 2 TIMES DAILY
COMMUNITY
End: 2023-11-14 | Stop reason: ALTCHOICE

## 2023-09-22 RX ORDER — LOSARTAN POTASSIUM 25 MG/1
1 TABLET ORAL DAILY
COMMUNITY
End: 2023-11-14 | Stop reason: ALTCHOICE

## 2023-09-22 RX ORDER — SOTALOL HYDROCHLORIDE 240 MG/1
1 TABLET ORAL 2 TIMES DAILY
COMMUNITY
Start: 2021-01-05 | End: 2024-01-09 | Stop reason: SDUPTHER

## 2023-09-22 RX ORDER — NAPROXEN SODIUM 220 MG/1
2 TABLET, FILM COATED ORAL DAILY
COMMUNITY
Start: 2019-04-14 | End: 2023-11-14 | Stop reason: ALTCHOICE

## 2023-09-22 RX ORDER — ESCITALOPRAM OXALATE 10 MG/1
1 TABLET ORAL DAILY
COMMUNITY
Start: 2021-08-25 | End: 2023-11-14 | Stop reason: SDUPTHER

## 2023-09-22 RX ORDER — LAMOTRIGINE 100 MG/1
1 TABLET ORAL 2 TIMES DAILY
COMMUNITY

## 2023-09-22 RX ORDER — METOPROLOL SUCCINATE 25 MG/1
0.5 TABLET, EXTENDED RELEASE ORAL DAILY
COMMUNITY
End: 2023-11-14 | Stop reason: ALTCHOICE

## 2023-09-22 RX ORDER — FUROSEMIDE 40 MG/1
1 TABLET ORAL DAILY
COMMUNITY
End: 2024-02-23 | Stop reason: DRUGHIGH

## 2023-10-06 ENCOUNTER — OFFICE VISIT (OUTPATIENT)
Dept: NEUROLOGY | Age: 73
End: 2023-10-06
Payer: MEDICARE

## 2023-10-06 VITALS
SYSTOLIC BLOOD PRESSURE: 122 MMHG | BODY MASS INDEX: 52.13 KG/M2 | HEART RATE: 75 BPM | HEIGHT: 62 IN | WEIGHT: 283.3 LBS | DIASTOLIC BLOOD PRESSURE: 60 MMHG | OXYGEN SATURATION: 95 %

## 2023-10-06 DIAGNOSIS — M47.817 LUMBOSACRAL SPONDYLOSIS WITHOUT MYELOPATHY: ICD-10-CM

## 2023-10-06 DIAGNOSIS — R56.9 PARTIAL SEIZURES (HCC): Primary | ICD-10-CM

## 2023-10-06 DIAGNOSIS — Z86.69 HISTORY OF SEIZURE DISORDER: ICD-10-CM

## 2023-10-06 DIAGNOSIS — R27.0 ATAXIA: ICD-10-CM

## 2023-10-06 LAB
ALBUMIN SERPL-MCNC: 4.3 G/DL (ref 3.5–4.6)
ALP SERPL-CCNC: 80 U/L (ref 40–130)
ALT SERPL-CCNC: 11 U/L (ref 0–33)
AST SERPL-CCNC: 18 U/L (ref 0–35)
BILIRUB DIRECT SERPL-MCNC: <0.2 MG/DL (ref 0–0.4)
BILIRUB INDIRECT SERPL-MCNC: NORMAL MG/DL (ref 0–0.6)
BILIRUB SERPL-MCNC: 0.6 MG/DL (ref 0.2–0.7)
PROT SERPL-MCNC: 6.9 G/DL (ref 6.3–8)

## 2023-10-06 PROCEDURE — 1036F TOBACCO NON-USER: CPT | Performed by: PSYCHIATRY & NEUROLOGY

## 2023-10-06 PROCEDURE — 99214 OFFICE O/P EST MOD 30 MIN: CPT | Performed by: PSYCHIATRY & NEUROLOGY

## 2023-10-06 PROCEDURE — G8417 CALC BMI ABV UP PARAM F/U: HCPCS | Performed by: PSYCHIATRY & NEUROLOGY

## 2023-10-06 PROCEDURE — 3017F COLORECTAL CA SCREEN DOC REV: CPT | Performed by: PSYCHIATRY & NEUROLOGY

## 2023-10-06 PROCEDURE — G8400 PT W/DXA NO RESULTS DOC: HCPCS | Performed by: PSYCHIATRY & NEUROLOGY

## 2023-10-06 PROCEDURE — 3078F DIAST BP <80 MM HG: CPT | Performed by: PSYCHIATRY & NEUROLOGY

## 2023-10-06 PROCEDURE — 3074F SYST BP LT 130 MM HG: CPT | Performed by: PSYCHIATRY & NEUROLOGY

## 2023-10-06 PROCEDURE — 1090F PRES/ABSN URINE INCON ASSESS: CPT | Performed by: PSYCHIATRY & NEUROLOGY

## 2023-10-06 PROCEDURE — G8427 DOCREV CUR MEDS BY ELIG CLIN: HCPCS | Performed by: PSYCHIATRY & NEUROLOGY

## 2023-10-06 PROCEDURE — 1123F ACP DISCUSS/DSCN MKR DOCD: CPT | Performed by: PSYCHIATRY & NEUROLOGY

## 2023-10-06 PROCEDURE — G8484 FLU IMMUNIZE NO ADMIN: HCPCS | Performed by: PSYCHIATRY & NEUROLOGY

## 2023-10-06 ASSESSMENT — ENCOUNTER SYMPTOMS
SHORTNESS OF BREATH: 0
NAUSEA: 0
CHOKING: 0
TROUBLE SWALLOWING: 0
BACK PAIN: 1
VOMITING: 0
COLOR CHANGE: 0
PHOTOPHOBIA: 0

## 2023-10-06 NOTE — PROGRESS NOTES
Subjective:      Patient ID: Nenita Celis is a 74 y.o. female who presents today for:  Chief Complaint   Patient presents with    6 Month Follow-Up     Patient states everything going well. She fell 1 week ago because her knee pain       HPI 74 right-handed female with a known history of seizure disorder. Patient is Lamictal 100 g twice a day without any recurrent seizures. 1 fall which was mechanical without any injuries. When last seen we had made some changes in her medications. Patient has not had any seizures. She sleeps well and is not using her CPAP machine. She denies any memory issues. 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 Past Medical History:   Diagnosis Date    Depression     Hypertension      Past Surgical History:   Procedure Laterality Date    JOINT REPLACEMENT      right hip    JOINT REPLACEMENT      right knee     Social History     Socioeconomic History    Marital status:      Spouse name: Not on file    Number of children: Not on file    Years of education: Not on file    Highest education level: Not on file   Occupational History    Not on file   Tobacco Use    Smoking status: Former     Packs/day: 0.50     Years: 1.00     Additional pack years: 0.00     Total pack years: 0.50     Types: Cigarettes     Start date: 1968     Quit date: 1968     Years since quittin.8    Smokeless tobacco: Never   Substance and Sexual Activity    Alcohol use:  Yes     Alcohol/week: 6.0 standard drinks of alcohol     Types: 6 Glasses of wine per week    Drug use: No    Sexual activity: Not on file   Other Topics Concern    Not on file   Social History Narrative    Not on file     Social Determinants of Health     Financial Resource Strain: Not on file   Food Insecurity: Not on file   Transportation Needs: Not on file   Physical Activity: Not on file   Stress: Not on file   Social

## 2023-10-08 LAB — LAMOTRIGINE SERPL-MCNC: 5.3 UG/ML (ref 3–15)

## 2023-10-20 ENCOUNTER — TELEPHONE (OUTPATIENT)
Dept: PRIMARY CARE | Facility: CLINIC | Age: 73
End: 2023-10-20
Payer: MEDICARE

## 2023-10-20 DIAGNOSIS — E78.5 HYPERLIPIDEMIA, UNSPECIFIED HYPERLIPIDEMIA TYPE: ICD-10-CM

## 2023-10-20 DIAGNOSIS — Z00.00 ROUTINE GENERAL MEDICAL EXAMINATION AT A HEALTH CARE FACILITY: ICD-10-CM

## 2023-10-20 DIAGNOSIS — E55.9 VITAMIN D DEFICIENCY: ICD-10-CM

## 2023-10-23 ENCOUNTER — APPOINTMENT (OUTPATIENT)
Dept: CARDIOLOGY | Facility: CLINIC | Age: 73
End: 2023-10-23
Payer: MEDICARE

## 2023-11-10 ENCOUNTER — LAB (OUTPATIENT)
Dept: LAB | Facility: LAB | Age: 73
End: 2023-11-10
Payer: MEDICARE

## 2023-11-10 DIAGNOSIS — Z00.00 ROUTINE GENERAL MEDICAL EXAMINATION AT A HEALTH CARE FACILITY: ICD-10-CM

## 2023-11-10 DIAGNOSIS — E78.5 HYPERLIPIDEMIA, UNSPECIFIED HYPERLIPIDEMIA TYPE: ICD-10-CM

## 2023-11-10 DIAGNOSIS — E55.9 VITAMIN D DEFICIENCY: ICD-10-CM

## 2023-11-10 LAB
25(OH)D3 SERPL-MCNC: 37 NG/ML (ref 30–100)
ALBUMIN SERPL BCP-MCNC: 4.1 G/DL (ref 3.4–5)
ALP SERPL-CCNC: 59 U/L (ref 33–136)
ALT SERPL W P-5'-P-CCNC: 23 U/L (ref 7–45)
ANION GAP SERPL CALC-SCNC: 14 MMOL/L (ref 10–20)
AST SERPL W P-5'-P-CCNC: 24 U/L (ref 9–39)
BASOPHILS # BLD AUTO: 0.06 X10*3/UL (ref 0–0.1)
BASOPHILS NFR BLD AUTO: 0.6 %
BILIRUB SERPL-MCNC: 0.8 MG/DL (ref 0–1.2)
BUN SERPL-MCNC: 12 MG/DL (ref 6–23)
CALCIUM SERPL-MCNC: 9.4 MG/DL (ref 8.6–10.3)
CHLORIDE SERPL-SCNC: 105 MMOL/L (ref 98–107)
CHOLEST SERPL-MCNC: 150 MG/DL (ref 0–199)
CHOLESTEROL/HDL RATIO: 3.5
CO2 SERPL-SCNC: 28 MMOL/L (ref 21–32)
CREAT SERPL-MCNC: 0.86 MG/DL (ref 0.5–1.05)
CRP SERPL-MCNC: 0.5 MG/DL
EOSINOPHIL # BLD AUTO: 0.47 X10*3/UL (ref 0–0.4)
EOSINOPHIL NFR BLD AUTO: 4.6 %
ERYTHROCYTE [DISTWIDTH] IN BLOOD BY AUTOMATED COUNT: 13.2 % (ref 11.5–14.5)
ERYTHROCYTE [SEDIMENTATION RATE] IN BLOOD BY WESTERGREN METHOD: 22 MM/H (ref 0–30)
GFR SERPL CREATININE-BSD FRML MDRD: 71 ML/MIN/1.73M*2
GLUCOSE SERPL-MCNC: 92 MG/DL (ref 74–99)
HCT VFR BLD AUTO: 47.1 % (ref 36–46)
HDLC SERPL-MCNC: 42.7 MG/DL
HGB BLD-MCNC: 15.4 G/DL (ref 12–16)
IMM GRANULOCYTES # BLD AUTO: 0.06 X10*3/UL (ref 0–0.5)
IMM GRANULOCYTES NFR BLD AUTO: 0.6 % (ref 0–0.9)
LDLC SERPL CALC-MCNC: 83 MG/DL
LYMPHOCYTES # BLD AUTO: 3.49 X10*3/UL (ref 0.8–3)
LYMPHOCYTES NFR BLD AUTO: 34 %
MAGNESIUM SERPL-MCNC: 1.94 MG/DL (ref 1.6–2.4)
MCH RBC QN AUTO: 29.9 PG (ref 26–34)
MCHC RBC AUTO-ENTMCNC: 32.7 G/DL (ref 32–36)
MCV RBC AUTO: 92 FL (ref 80–100)
MONOCYTES # BLD AUTO: 0.66 X10*3/UL (ref 0.05–0.8)
MONOCYTES NFR BLD AUTO: 6.4 %
NEUTROPHILS # BLD AUTO: 5.52 X10*3/UL (ref 1.6–5.5)
NEUTROPHILS NFR BLD AUTO: 53.8 %
NON HDL CHOLESTEROL: 107 MG/DL (ref 0–149)
NRBC BLD-RTO: 0 /100 WBCS (ref 0–0)
PLATELET # BLD AUTO: 259 X10*3/UL (ref 150–450)
POTASSIUM SERPL-SCNC: 4 MMOL/L (ref 3.5–5.3)
PROT SERPL-MCNC: 6.3 G/DL (ref 6.4–8.2)
RBC # BLD AUTO: 5.15 X10*6/UL (ref 4–5.2)
SODIUM SERPL-SCNC: 143 MMOL/L (ref 136–145)
TRIGL SERPL-MCNC: 124 MG/DL (ref 0–149)
TSH SERPL-ACNC: 1.63 MIU/L (ref 0.44–3.98)
VLDL: 25 MG/DL (ref 0–40)
WBC # BLD AUTO: 10.3 X10*3/UL (ref 4.4–11.3)

## 2023-11-10 PROCEDURE — 84443 ASSAY THYROID STIM HORMONE: CPT

## 2023-11-10 PROCEDURE — 80061 LIPID PANEL: CPT

## 2023-11-10 PROCEDURE — 82306 VITAMIN D 25 HYDROXY: CPT

## 2023-11-10 PROCEDURE — 83735 ASSAY OF MAGNESIUM: CPT

## 2023-11-10 PROCEDURE — 85025 COMPLETE CBC W/AUTO DIFF WBC: CPT

## 2023-11-10 PROCEDURE — 86140 C-REACTIVE PROTEIN: CPT

## 2023-11-10 PROCEDURE — 80053 COMPREHEN METABOLIC PANEL: CPT

## 2023-11-10 PROCEDURE — 36415 COLL VENOUS BLD VENIPUNCTURE: CPT

## 2023-11-10 PROCEDURE — 85652 RBC SED RATE AUTOMATED: CPT

## 2023-11-14 ENCOUNTER — OFFICE VISIT (OUTPATIENT)
Dept: PRIMARY CARE | Facility: CLINIC | Age: 73
End: 2023-11-14
Payer: MEDICARE

## 2023-11-14 VITALS
WEIGHT: 272 LBS | RESPIRATION RATE: 16 BRPM | BODY MASS INDEX: 48.2 KG/M2 | HEIGHT: 63 IN | TEMPERATURE: 96.3 F | HEART RATE: 76 BPM

## 2023-11-14 DIAGNOSIS — J45.20 MILD INTERMITTENT ASTHMA WITHOUT COMPLICATION (HHS-HCC): ICD-10-CM

## 2023-11-14 DIAGNOSIS — E78.2 MIXED HYPERLIPIDEMIA: ICD-10-CM

## 2023-11-14 DIAGNOSIS — Z00.00 ROUTINE GENERAL MEDICAL EXAMINATION AT HEALTH CARE FACILITY: Primary | ICD-10-CM

## 2023-11-14 DIAGNOSIS — I10 ESSENTIAL HYPERTENSION: ICD-10-CM

## 2023-11-14 DIAGNOSIS — F32.1 CURRENT MODERATE EPISODE OF MAJOR DEPRESSIVE DISORDER, UNSPECIFIED WHETHER RECURRENT (MULTI): ICD-10-CM

## 2023-11-14 DIAGNOSIS — L57.0 ACTINIC KERATOSIS: ICD-10-CM

## 2023-11-14 DIAGNOSIS — Z12.11 ENCOUNTER FOR SCREENING FOR MALIGNANT NEOPLASM OF COLON: ICD-10-CM

## 2023-11-14 DIAGNOSIS — E66.01 CLASS 3 SEVERE OBESITY DUE TO EXCESS CALORIES WITH SERIOUS COMORBIDITY AND BODY MASS INDEX (BMI) OF 45.0 TO 49.9 IN ADULT (MULTI): ICD-10-CM

## 2023-11-14 DIAGNOSIS — I27.20 PULMONARY HYPERTENSION (MULTI): ICD-10-CM

## 2023-11-14 DIAGNOSIS — I49.5 SINUS NODE DYSFUNCTION (MULTI): ICD-10-CM

## 2023-11-14 PROBLEM — E66.813 CLASS 3 SEVERE OBESITY DUE TO EXCESS CALORIES WITH SERIOUS COMORBIDITY AND BODY MASS INDEX (BMI) OF 45.0 TO 49.9 IN ADULT: Status: ACTIVE | Noted: 2023-09-22

## 2023-11-14 PROCEDURE — 1036F TOBACCO NON-USER: CPT | Performed by: FAMILY MEDICINE

## 2023-11-14 PROCEDURE — 99214 OFFICE O/P EST MOD 30 MIN: CPT | Performed by: FAMILY MEDICINE

## 2023-11-14 PROCEDURE — 3078F DIAST BP <80 MM HG: CPT | Performed by: FAMILY MEDICINE

## 2023-11-14 PROCEDURE — 1159F MED LIST DOCD IN RCRD: CPT | Performed by: FAMILY MEDICINE

## 2023-11-14 PROCEDURE — 1170F FXNL STATUS ASSESSED: CPT | Performed by: FAMILY MEDICINE

## 2023-11-14 PROCEDURE — G0439 PPPS, SUBSEQ VISIT: HCPCS | Performed by: FAMILY MEDICINE

## 2023-11-14 PROCEDURE — 1160F RVW MEDS BY RX/DR IN RCRD: CPT | Performed by: FAMILY MEDICINE

## 2023-11-14 PROCEDURE — 3074F SYST BP LT 130 MM HG: CPT | Performed by: FAMILY MEDICINE

## 2023-11-14 RX ORDER — ESCITALOPRAM OXALATE 20 MG/1
20 TABLET ORAL DAILY
Qty: 30 TABLET | Refills: 11 | Status: SHIPPED | OUTPATIENT
Start: 2023-11-14 | End: 2023-12-18 | Stop reason: SDUPTHER

## 2023-11-14 RX ORDER — ALBUTEROL SULFATE 90 UG/1
2 AEROSOL, METERED RESPIRATORY (INHALATION) EVERY 6 HOURS
Qty: 18 G | Refills: 3 | Status: SHIPPED | OUTPATIENT
Start: 2023-11-14

## 2023-11-14 ASSESSMENT — ENCOUNTER SYMPTOMS
DIAPHORESIS: 0
SORE THROAT: 0
RHINORRHEA: 0
CONSTIPATION: 0
SHORTNESS OF BREATH: 1
EYE REDNESS: 0
AGITATION: 0
NERVOUS/ANXIOUS: 0
DEPRESSION: 1
MYALGIAS: 0
BLOOD IN STOOL: 0
CONFUSION: 0
HYPERTENSION: 1
CHEST TIGHTNESS: 0
JOINT SWELLING: 0
BACK PAIN: 1
FATIGUE: 1
HEADACHES: 0
DIZZINESS: 0
PHOTOPHOBIA: 0
FLANK PAIN: 0
TREMORS: 0
VOMITING: 0
DYSURIA: 0
DIARRHEA: 0
LIGHT-HEADEDNESS: 0
HEMATURIA: 0
ARTHRALGIAS: 1
NECK STIFFNESS: 0
COUGH: 0
NAUSEA: 0
CHILLS: 0
WHEEZING: 0
CHOKING: 0
BRUISES/BLEEDS EASILY: 0
FREQUENCY: 0
ABDOMINAL DISTENTION: 0
EYE PAIN: 0
SINUS PRESSURE: 0
SLEEP DISTURBANCE: 0
WEAKNESS: 0
ABDOMINAL PAIN: 0
SEIZURES: 0
APPETITE CHANGE: 0
TROUBLE SWALLOWING: 0
SPEECH DIFFICULTY: 0
DYSPHORIC MOOD: 0
ACTIVITY CHANGE: 0
POLYDIPSIA: 0
EYE DISCHARGE: 0
LOSS OF SENSATION IN FEET: 0
UNEXPECTED WEIGHT CHANGE: 0
WOUND: 0
EYE ITCHING: 0
ADENOPATHY: 0
NUMBNESS: 0
PALPITATIONS: 0
NECK PAIN: 0
HALLUCINATIONS: 0
VOICE CHANGE: 0
OCCASIONAL FEELINGS OF UNSTEADINESS: 1
FEVER: 0
FACIAL ASYMMETRY: 0

## 2023-11-14 ASSESSMENT — PATIENT HEALTH QUESTIONNAIRE - PHQ9
3. TROUBLE FALLING OR STAYING ASLEEP OR SLEEPING TOO MUCH: MORE THAN HALF THE DAYS
7. TROUBLE CONCENTRATING ON THINGS, SUCH AS READING THE NEWSPAPER OR WATCHING TELEVISION: SEVERAL DAYS
10. IF YOU CHECKED OFF ANY PROBLEMS, HOW DIFFICULT HAVE THESE PROBLEMS MADE IT FOR YOU TO DO YOUR WORK, TAKE CARE OF THINGS AT HOME, OR GET ALONG WITH OTHER PEOPLE: VERY DIFFICULT
4. FEELING TIRED OR HAVING LITTLE ENERGY: SEVERAL DAYS
1. LITTLE INTEREST OR PLEASURE IN DOING THINGS: NEARLY EVERY DAY
2. FEELING DOWN, DEPRESSED OR HOPELESS: NEARLY EVERY DAY
9. THOUGHTS THAT YOU WOULD BE BETTER OFF DEAD, OR OF HURTING YOURSELF: SEVERAL DAYS
SUM OF ALL RESPONSES TO PHQ QUESTIONS 1-9: 15
5. POOR APPETITE OR OVEREATING: SEVERAL DAYS
8. MOVING OR SPEAKING SO SLOWLY THAT OTHER PEOPLE COULD HAVE NOTICED. OR THE OPPOSITE, BEING SO FIGETY OR RESTLESS THAT YOU HAVE BEEN MOVING AROUND A LOT MORE THAN USUAL: MORE THAN HALF THE DAYS
SUM OF ALL RESPONSES TO PHQ9 QUESTIONS 1 AND 2: 6
6. FEELING BAD ABOUT YOURSELF - OR THAT YOU ARE A FAILURE OR HAVE LET YOURSELF OR YOUR FAMILY DOWN: SEVERAL DAYS

## 2023-11-14 ASSESSMENT — ACTIVITIES OF DAILY LIVING (ADL)
DRESSING: INDEPENDENT
BATHING: INDEPENDENT
MANAGING_FINANCES: INDEPENDENT
DOING_HOUSEWORK: NEEDS ASSISTANCE
GROCERY_SHOPPING: INDEPENDENT
TAKING_MEDICATION: INDEPENDENT

## 2023-11-14 NOTE — PROGRESS NOTES
Subjective   Patient ID: Megan Guerra is a 73 y.o. female who presents for Medicare Annual Wellness Visit Subsequent (And review labs ).  Hypertension  This is a chronic problem. The current episode started more than 1 year ago. The problem is unchanged. The problem is controlled. Associated symptoms include malaise/fatigue, peripheral edema and shortness of breath. Pertinent negatives include no chest pain, headaches, neck pain or palpitations. There are no associated agents to hypertension. Risk factors for coronary artery disease include obesity, post-menopausal state, sedentary lifestyle and dyslipidemia. Past treatments include diuretics. The current treatment provides significant improvement. There are no compliance problems.  There is no history of a hypertension causing med or a thyroid problem.   Hyperlipidemia  This is a chronic problem. The current episode started more than 1 year ago. The problem is controlled. Recent lipid tests were reviewed and are normal. Exacerbating diseases include obesity. There are no known factors aggravating her hyperlipidemia. Associated symptoms include shortness of breath. Pertinent negatives include no chest pain or myalgias. Current antihyperlipidemic treatment includes statins. The current treatment provides significant improvement of lipids. There are no compliance problems.  Risk factors for coronary artery disease include dyslipidemia, hypertension, obesity, post-menopausal and a sedentary lifestyle.       Review of Systems   Constitutional:  Positive for fatigue and malaise/fatigue. Negative for activity change, appetite change, chills, diaphoresis, fever and unexpected weight change.   HENT:  Negative for congestion, ear pain, hearing loss, nosebleeds, postnasal drip, rhinorrhea, sinus pressure, sneezing, sore throat, tinnitus, trouble swallowing and voice change.    Eyes:  Negative for photophobia, pain, discharge, redness, itching and visual disturbance.  "  Respiratory:  Positive for shortness of breath. Negative for cough, choking, chest tightness and wheezing.    Cardiovascular:  Positive for leg swelling. Negative for chest pain and palpitations.   Gastrointestinal:  Negative for abdominal distention, abdominal pain, blood in stool, constipation, diarrhea, nausea and vomiting.   Endocrine: Negative for cold intolerance, heat intolerance, polydipsia and polyuria.   Genitourinary:  Negative for dysuria, flank pain, frequency, hematuria and urgency.   Musculoskeletal:  Positive for arthralgias and back pain. Negative for joint swelling, myalgias, neck pain and neck stiffness.   Skin:  Negative for rash and wound.   Allergic/Immunologic: Negative for immunocompromised state.   Neurological:  Negative for dizziness, tremors, seizures, syncope, facial asymmetry, speech difficulty, weakness, light-headedness, numbness and headaches.   Hematological:  Negative for adenopathy. Does not bruise/bleed easily.   Psychiatric/Behavioral:  Negative for agitation, behavioral problems, confusion, dysphoric mood, hallucinations, self-injury, sleep disturbance and suicidal ideas. The patient is not nervous/anxious.        Objective   Pulse 76   Temp 35.7 °C (96.3 °F) (Temporal)   Resp 16   Ht 1.588 m (5' 2.5\")   Wt 123 kg (272 lb)   BMI 48.96 kg/m²   Physical Exam  Constitutional:       General: She is not in acute distress.     Appearance: She is obese. She is not ill-appearing or diaphoretic.   HENT:      Head: Normocephalic and atraumatic.      Right Ear: External ear normal.      Left Ear: External ear normal.      Nose: Nose normal. No rhinorrhea.   Eyes:      General: Lids are normal. No scleral icterus.        Right eye: No discharge.         Left eye: No discharge.      Conjunctiva/sclera: Conjunctivae normal.   Cardiovascular:      Rate and Rhythm: Normal rate and regular rhythm.      Pulses: Normal pulses.      Heart sounds: No murmur heard.  Pulmonary:      Effort: " Pulmonary effort is normal. No respiratory distress.      Breath sounds: No decreased breath sounds, wheezing, rhonchi or rales.   Abdominal:      General: Bowel sounds are normal. There is no distension.      Palpations: Abdomen is soft. There is no mass.      Tenderness: There is no abdominal tenderness. There is no guarding or rebound.   Musculoskeletal:         General: No swelling or tenderness.      Cervical back: No rigidity or tenderness.      Right lower leg: Edema (trace) present.      Left lower leg: Edema (trace) present.      Comments: Diffuse arthritic deformities noted   Lymphadenopathy:      Cervical: No cervical adenopathy.      Upper Body:      Right upper body: No supraclavicular adenopathy.      Left upper body: No supraclavicular adenopathy.   Skin:     General: Skin is warm and dry.      Coloration: Skin is not jaundiced or pale.      Findings: No erythema, lesion or rash.   Neurological:      General: No focal deficit present.      Mental Status: She is alert and oriented to person, place, and time.      Sensory: No sensory deficit.      Motor: No weakness or tremor.      Coordination: Coordination normal.      Gait: Gait normal.   Psychiatric:         Mood and Affect: Mood normal. Affect is not inappropriate.         Behavior: Behavior normal.         Assessment/Plan   Problem List Items Addressed This Visit       Actinic keratosis    Relevant Orders    Referral to Dermatology    Follow Up In [facility] - PCP - Established    Depression    Relevant Medications    escitalopram (Lexapro) 20 mg tablet    Other Relevant Orders    Follow Up In [facility] - PCP - Established    Essential hypertension    Relevant Orders    CBC and Auto Differential    Comprehensive Metabolic Panel    Lipid Panel    Magnesium    TSH with reflex to Free T4 if abnormal    Follow Up In [facility] - PCP - Established    Hyperlipidemia    Relevant Orders    CBC and Auto Differential     Comprehensive Metabolic Panel    Lipid Panel    Magnesium    TSH with reflex to Free T4 if abnormal    Follow Up In [facility] - PCP - Established    Pulmonary hypertension (CMS/[facility])     Continue to follow with cardiology as recommended.         Relevant Orders    Follow Up In [facility] - PCP - Established    Mild intermittent asthma without complication    Relevant Medications    albuterol (ProAir HFA) 90 mcg/actuation inhaler    Other Relevant Orders    Pulmonary function testing (Ancillary Performed)    Follow Up In [facility] - PCP - Established    Sinus node dysfunction (CMS/[facility])     Continue to follow with cardiology as recommended.         Relevant Orders    Follow Up In [facility] - PCP - Established    Class 3 severe obesity due to excess calories with serious comorbidity and body mass index (BMI) of 45.0 to 49.9 in adult (CMS/[facility])    Relevant Orders    CBC and Auto Differential    Comprehensive Metabolic Panel    Lipid Panel    Magnesium    TSH with reflex to Free T4 if abnormal    Follow Up In [facility] - PCP - Established     Other Visit Diagnoses       Routine general medical examination at health care facility    -  Primary    Relevant Orders    Follow Up In [facility] - PCP - Established    Encounter for screening for malignant neoplasm of colon        Relevant Orders    Colonoscopy Screening; Average Risk Patient    Follow Up In [facility] - PCP - Established        Discussed risks of torsade with Lexapro.  Patient stated understanding and acceptance of risks.  She feels that benefits outweigh the risks.    Discussed interaction of sotalol and albuterol.  Patient stated understanding and acceptance of risks.  Patient feels both are adequately effective and does not desire to switch dosing at this point.

## 2023-11-14 NOTE — PATIENT INSTRUCTIONS
BMI was above normal measurement. Current weight: 123 kg (272 lb)  Weight change since last visit (-) denotes wt loss -13 lbs   Weight loss needed to achieve BMI 25: 133.4 Lbs  Weight loss needed to achieve BMI 30: 105.7 Lbs  Provided instructions on dietary changes  Provided instructions on exercise  Advised to Increase physical activity.

## 2023-11-27 RX ORDER — LAMOTRIGINE 100 MG/1
100 TABLET ORAL 2 TIMES DAILY
Qty: 60 TABLET | Refills: 10 | Status: SHIPPED | OUTPATIENT
Start: 2023-11-27 | End: 2024-10-22

## 2023-11-27 NOTE — TELEPHONE ENCOUNTER
Patient is requesting medication refill.  Please approve or deny this request.    Rx requested:  Requested Prescriptions     Pending Prescriptions Disp Refills    lamoTRIgine (LAMICTAL) 100 MG tablet 60 tablet 10     Sig: Take 1 tablet by mouth 2 times daily         Last Office Visit:   10/6/2023      Next Visit Date:  Future Appointments   Date Time Provider [facility] [address]   10/18/2024  8:15 AM Caron Wilder MD [facility] Neurology -

## 2023-12-18 DIAGNOSIS — F32.1 CURRENT MODERATE EPISODE OF MAJOR DEPRESSIVE DISORDER, UNSPECIFIED WHETHER RECURRENT (MULTI): ICD-10-CM

## 2023-12-18 RX ORDER — ESCITALOPRAM OXALATE 20 MG/1
20 TABLET ORAL DAILY
Qty: 90 TABLET | Refills: 3 | Status: SHIPPED | OUTPATIENT
Start: 2023-12-18 | End: 2024-12-17

## 2024-01-04 ENCOUNTER — APPOINTMENT (OUTPATIENT)
Dept: CARDIOLOGY | Facility: CLINIC | Age: 74
End: 2024-01-04
Payer: MEDICARE

## 2024-01-09 DIAGNOSIS — I48.0 PAROXYSMAL ATRIAL FIBRILLATION (MULTI): ICD-10-CM

## 2024-01-09 RX ORDER — SOTALOL HYDROCHLORIDE 240 MG/1
240 TABLET ORAL 2 TIMES DAILY
Qty: 180 TABLET | Refills: 1 | Status: SHIPPED | OUTPATIENT
Start: 2024-01-09 | End: 2024-02-23 | Stop reason: SDUPTHER

## 2024-02-23 ENCOUNTER — OFFICE VISIT (OUTPATIENT)
Dept: CARDIOLOGY | Facility: CLINIC | Age: 74
End: 2024-02-23
Payer: MEDICARE

## 2024-02-23 VITALS
BODY MASS INDEX: 49.96 KG/M2 | SYSTOLIC BLOOD PRESSURE: 112 MMHG | DIASTOLIC BLOOD PRESSURE: 74 MMHG | WEIGHT: 277.6 LBS | HEART RATE: 63 BPM

## 2024-02-23 DIAGNOSIS — R00.2 PALPITATIONS: ICD-10-CM

## 2024-02-23 DIAGNOSIS — I48.11 LONGSTANDING PERSISTENT ATRIAL FIBRILLATION (MULTI): ICD-10-CM

## 2024-02-23 DIAGNOSIS — I48.0 PAROXYSMAL ATRIAL FIBRILLATION (MULTI): ICD-10-CM

## 2024-02-23 DIAGNOSIS — Z51.81 ANTICOAGULATION MANAGEMENT ENCOUNTER: ICD-10-CM

## 2024-02-23 DIAGNOSIS — Z87.891 FORMER SMOKER: ICD-10-CM

## 2024-02-23 DIAGNOSIS — Z79.01 ANTICOAGULATION MANAGEMENT ENCOUNTER: ICD-10-CM

## 2024-02-23 DIAGNOSIS — Z79.899 HIGH RISK MEDICATION USE: Primary | ICD-10-CM

## 2024-02-23 PROCEDURE — 3078F DIAST BP <80 MM HG: CPT | Performed by: INTERNAL MEDICINE

## 2024-02-23 PROCEDURE — 1036F TOBACCO NON-USER: CPT | Performed by: INTERNAL MEDICINE

## 2024-02-23 PROCEDURE — 93000 ELECTROCARDIOGRAM COMPLETE: CPT | Performed by: INTERNAL MEDICINE

## 2024-02-23 PROCEDURE — 99214 OFFICE O/P EST MOD 30 MIN: CPT | Performed by: INTERNAL MEDICINE

## 2024-02-23 PROCEDURE — 3074F SYST BP LT 130 MM HG: CPT | Performed by: INTERNAL MEDICINE

## 2024-02-23 PROCEDURE — 1159F MED LIST DOCD IN RCRD: CPT | Performed by: INTERNAL MEDICINE

## 2024-02-23 RX ORDER — SOTALOL HYDROCHLORIDE 240 MG/1
240 TABLET ORAL 2 TIMES DAILY
Qty: 200 TABLET | Refills: 1 | Status: SHIPPED | OUTPATIENT
Start: 2024-02-23 | End: 2024-04-19 | Stop reason: SDUPTHER

## 2024-02-23 RX ORDER — FUROSEMIDE 40 MG/1
TABLET ORAL
Qty: 135 TABLET | Refills: 1 | Status: SHIPPED | OUTPATIENT
Start: 2024-02-23 | End: 2024-03-08 | Stop reason: SDUPTHER

## 2024-02-23 NOTE — PATIENT INSTRUCTIONS
Increase Lasix (Furosemide ) 40 mg 1 1/2 tablets x 4 days then resume 1 tablet daily thereafter. If swelling of lower extremities can increased to 1 1/2 as needed.     -Please bring all medicines, vitamins, and herbal supplements with you in original bottles to every appointment!!!!    -Prescriptions will not be filled unless you are compliant with your follow up appointments or have a follow up appointment scheduled as per instruction of your physician. Refills should be requested at the time of your visit.

## 2024-02-23 NOTE — PROGRESS NOTES
CARDIOLOGY OFFICE VISIT      CHIEF COMPLAINT  Chief Complaint   Patient presents with    Follow-up     6 months         HISTORY OF PRESENT ILLNESS  HPI    73-year-old female with a past medical history of hypertension admitted recently to the hospital for new diagnosis of atrial fibrillation. Patient went for a GOLD guided cardioversion with successful restoration to normal sinus rhythm but patient had recurrence of atrial fibrillation. She was loaded with sotalol 80 mg 1 tablet twice a day and she was brought back again for cardioversion with successful restoration to normal sinus rhythm but she had early recurrence of this arrhythmia. Her dose was increased to 120 mg twice a day and then she was attempted for another cardioversion with successful restoration to normal sinus rhythm but again early recurrence of this arrhythmia.      The dose of the sotalol was increased to 240 mg twice a day and she underwent another cardioversion that happened in June 21, 2019 with successful results.    Overall she has been doing well.  She denies any symptoms of chest pain or shortness with her palpitations.    She has been noticing for the last 2 to 3 weeks worsening edema in the lower extremities.    EKG performed today shows sinus bradycardia at a rate of 54 bpm QRS duration 90 ms QT corrected 460 ms.  Rhythm strip shows the same pattern.        Past Medical History  Past Medical History:   Diagnosis Date    Elevated blood-pressure reading, without diagnosis of hypertension     Elevated blood pressure reading    Encounter for other preprocedural examination     Preop examination    Encounter for other screening for malignant neoplasm of breast     Screening for breast cancer    Encounter for screening for malignant neoplasm of colon 06/17/2020    Colon cancer screening    Personal history of other diseases of the musculoskeletal system and connective tissue     History of arthritis    Personal history of other diseases of the  musculoskeletal system and connective tissue     History of back pain    Personal history of other diseases of the respiratory system     History of bronchitis    Personal history of other medical treatment     H/O mammogram    Personal history of other medical treatment     History of screening mammography    Personal history of other mental and behavioral disorders 06/21/2021    History of depression    Personal history of other specified conditions     History of fatigue    Rash and other nonspecific skin eruption     Skin rash       Social History  Social History     Tobacco Use    Smoking status: Former     Types: Cigarettes     Passive exposure: Past    Smokeless tobacco: Never   Vaping Use    Vaping Use: Never used   Substance Use Topics    Alcohol use: Yes     Alcohol/week: 7.0 standard drinks of alcohol     Types: 7 Standard drinks or equivalent per week    Drug use: Never       Family History     Family History   Problem Relation Name Age of Onset    Diabetes Mother      Other (cardiac disorder) Father      Coronary artery disease Father      Esophageal cancer Brother x2     Other (Meningitis) Brother x2         Allergies:  Allergies   Allergen Reactions    Metoprolol Other     Depression, agitation, irritability     Oxcarbazepine Other     Depression     Adhesive Tape-Silicones Rash    Gabapentin Diarrhea    Naproxen Diarrhea        Outpatient Medications:  Current Outpatient Medications   Medication Instructions    acetaminophen (TYLENOL) 500 mg, oral, Daily    albuterol (ProAir HFA) 90 mcg/actuation inhaler 2 puffs, inhalation, Every 6 hours, As directed     apixaban (Eliquis) 5 mg tablet 1 tablet, oral, 2 times daily    escitalopram (LEXAPRO) 20 mg, oral, Daily    furosemide (Lasix) 40 mg tablet 1 tablet, oral, Daily    lamoTRIgine (LaMICtal) 100 mg tablet 1 tablet, oral, 2 times daily    MULTIVITAMIN ORAL 1 tablet, oral, Daily    sotalol (BETAPACE) 240 mg, oral, 2 times daily          REVIEW OF  SYSTEMS  Review of Systems   All other systems reviewed and are negative.        VITALS  Vitals:    02/23/24 1216   BP: 112/74   Pulse: 63       PHYSICAL EXAM  Constitutional:       Appearance: Healthy appearance. Not in distress.   Neck:      Vascular: No JVR. JVD normal.   Pulmonary:      Effort: Pulmonary effort is normal.      Breath sounds: Normal breath sounds. No wheezing. No rhonchi. No rales.   Chest:      Chest wall: Not tender to palpatation.   Cardiovascular:      PMI at left midclavicular line. Normal rate. Regular rhythm. Normal S1. Normal S2.       Murmurs: There is no murmur.      No gallop.  No click. No rub.   Pulses:     Intact distal pulses.   Edema:     Thigh: bilateral 2+ edema of the thigh.     Pretibial: bilateral 2+ edema of the pretibial area.     Ankle: bilateral 2+ edema of the ankle.     Feet: bilateral 2+ edema of the feet.  Abdominal:      General: Bowel sounds are normal.      Palpations: Abdomen is soft.      Tenderness: There is no abdominal tenderness.   Musculoskeletal: Normal range of motion.         General: No tenderness. Skin:     General: Skin is warm and dry.   Neurological:      General: No focal deficit present.      Mental Status: Alert and oriented to person, place and time.         ASSESSMENT AND PLAN  Clinical impression     1. Shortness of breath, resolved  2. Persistent atrial fibrillation, long-term plan discussed with patient during this office visit, long-term plan of this medication discussed with patient during this office visit  3. High-risk medication (sotalol)  4. Normal left ventricular function per GOLD performed in April 2018. Stable  5. No significant Coronary artery disease status post cardiac catheterization in 2010. Stable  6. long -term anticoagulation therapy with Eliquis, no evidence of bleeding  7. Hyperlipidemia, controlled  8. Bradycardia, asymptomatic      Plan recommendations    Get echocardiogram for left ventricular ejection fraction  relation.    Follow my office in next 2 to 3 months.    Patient was instructed to use Lasix 20 mg 1-1/2 tablets daily for 4 days and then 1 tablet daily.    Continue with high risk medication (sotalol).    Risk factor modification and lifestyle modification discussed with patient. Diet , exercise and hydration discussed with patient.    I have personally review with patient during this office visit, laboratory data, echocardiogram results, stress test results, Holter-event monitor results prior and after the last electrophysiology visit. All questions has been answered.    Please excuse any errors in grammar or translation related to this dictation.  Voice recognition software was utilized to prepare this document.      Scribe Attestation  By signing my name below, I, Nathalia Barker LPN , Scribe   attest that this documentation has been prepared under the direction and in the presence of Magdy Araya MD.

## 2024-02-26 NOTE — PROGRESS NOTES
Left message on pharmacy voicemail. Fifi     Additional Graft Basting Suture (Optional): 5-0 Plain Gut

## 2024-03-08 DIAGNOSIS — I48.11 LONGSTANDING PERSISTENT ATRIAL FIBRILLATION (MULTI): ICD-10-CM

## 2024-03-08 DIAGNOSIS — I48.0 PAROXYSMAL ATRIAL FIBRILLATION (MULTI): ICD-10-CM

## 2024-03-08 DIAGNOSIS — Z79.899 HIGH RISK MEDICATION USE: ICD-10-CM

## 2024-03-08 RX ORDER — FUROSEMIDE 40 MG/1
TABLET ORAL
Qty: 135 TABLET | Refills: 1 | Status: SHIPPED | OUTPATIENT
Start: 2024-03-08

## 2024-03-11 ENCOUNTER — TELEPHONE (OUTPATIENT)
Dept: CARDIOLOGY | Facility: CLINIC | Age: 74
End: 2024-03-11
Payer: MEDICARE

## 2024-03-11 NOTE — TELEPHONE ENCOUNTER
Returned call to patient regarding her Furosemide rx and [facility] in [place] needing more clear instructions.  Left voice mail instructing patient  to call the office with how she is currently taking Furosemide.  Zonia Masters, CMA

## 2024-03-11 NOTE — TELEPHONE ENCOUNTER
Patient returned call to office stating she is taking 1 tablet- 40 mg daily except when her legs swell up she increases dose to 1 1/2 (60 mg) daily for 4 days then resumes 40 mg daily.  Note to Dr. Araya for review and rx approval due to discrepancy with medication list.  Rx to be sent to [facility] in [place].  Zonia Masters, [facility]

## 2024-03-12 NOTE — TELEPHONE ENCOUNTER
Rec'd fax from pharmacy needing clarification on Furosemide dose. Direction states Furosemide 40 mg 1 1/2 tablets (30 mg), but should say '60 mg'. Pharmacy asking if we meant to send in Furosemide 20 mg tablets.      Per med list pt previously had 40 mg tablets. Pt confirmed that she was taking 40 mg 1 1/2 tablets x 4 days then 1 daily thereafter and per half this is also reflected.    Routed back to Dr. Araya for how pt should be taking Furosemide. 20 or 40 mg tablets and directions.

## 2024-03-14 NOTE — TELEPHONE ENCOUNTER
Called and advised pharmacist of Magdy Araya M.D.  message and verbalized understanding. Notified patient who also verbalized understanding. Kelley Avalos MA

## 2024-04-15 ENCOUNTER — ANCILLARY PROCEDURE (OUTPATIENT)
Dept: CARDIOLOGY | Facility: CLINIC | Age: 74
End: 2024-04-15
Payer: MEDICARE

## 2024-04-15 DIAGNOSIS — I48.11 LONGSTANDING PERSISTENT ATRIAL FIBRILLATION (MULTI): ICD-10-CM

## 2024-04-15 DIAGNOSIS — I48.0 PAROXYSMAL ATRIAL FIBRILLATION (MULTI): ICD-10-CM

## 2024-04-15 DIAGNOSIS — Z79.01 ANTICOAGULATION MANAGEMENT ENCOUNTER: ICD-10-CM

## 2024-04-15 DIAGNOSIS — R00.2 PALPITATIONS: ICD-10-CM

## 2024-04-15 DIAGNOSIS — Z51.81 ANTICOAGULATION MANAGEMENT ENCOUNTER: ICD-10-CM

## 2024-04-15 LAB
AORTIC VALVE MEAN GRADIENT: 9 MMHG
AORTIC VALVE PEAK VELOCITY: 1.97 M/S
AV PEAK GRADIENT: 15.5 MMHG
AVA (PEAK VEL): 1.56 CM2
AVA (VTI): 1.54 CM2
EJECTION FRACTION APICAL 4 CHAMBER: 64.4
LEFT VENTRICLE INTERNAL DIMENSION DIASTOLE: 4.92 CM (ref 3.5–6)
LEFT VENTRICULAR OUTFLOW TRACT DIAMETER: 1.8 CM
LV EJECTION FRACTION BIPLANE: 70 %
MITRAL VALVE E/A RATIO: 1.65
MITRAL VALVE E/E' RATIO: 11.8
RIGHT VENTRICLE PEAK SYSTOLIC PRESSURE: 36.2 MMHG

## 2024-04-15 PROCEDURE — 2500000004 HC RX 250 GENERAL PHARMACY W/ HCPCS (ALT 636 FOR OP/ED): Performed by: INTERNAL MEDICINE

## 2024-04-15 PROCEDURE — 93306 TTE W/DOPPLER COMPLETE: CPT | Performed by: INTERNAL MEDICINE

## 2024-04-15 PROCEDURE — 93306 TTE W/DOPPLER COMPLETE: CPT

## 2024-04-15 RX ADMIN — HUMAN ALBUMIN MICROSPHERES AND PERFLUTREN 0.5 ML: 10; .22 INJECTION, SOLUTION INTRAVENOUS at 14:25

## 2024-04-18 DIAGNOSIS — I48.0 PAROXYSMAL ATRIAL FIBRILLATION (MULTI): ICD-10-CM

## 2024-04-18 DIAGNOSIS — I48.11 LONGSTANDING PERSISTENT ATRIAL FIBRILLATION (MULTI): ICD-10-CM

## 2024-04-18 NOTE — TELEPHONE ENCOUNTER
The patient called into the office and left a voicemail. She stated that in the message she is having some issues with her medications. No other information was left. Called and was unable to get a hold of the patient. Left voicemail for patient to call the office back. Tatiana CHAPMAN

## 2024-04-19 RX ORDER — SOTALOL HYDROCHLORIDE 240 MG/1
240 TABLET ORAL 2 TIMES DAILY
Qty: 180 TABLET | Refills: 1 | Status: SHIPPED | OUTPATIENT
Start: 2024-04-19 | End: 2025-04-19

## 2024-04-19 NOTE — TELEPHONE ENCOUNTER
Pt states that rx went originally to [facility], and pt had [facility] transfer script to [facility]. Pt states that she is now having issues with [facility] pharmacy, and would like for script pt be sent back to [facility]. Orders pended. Fifi

## 2024-05-06 ENCOUNTER — OFFICE VISIT (OUTPATIENT)
Dept: CARDIOLOGY | Facility: CLINIC | Age: 74
End: 2024-05-06
Payer: MEDICARE

## 2024-05-06 VITALS
SYSTOLIC BLOOD PRESSURE: 128 MMHG | WEIGHT: 277 LBS | DIASTOLIC BLOOD PRESSURE: 68 MMHG | HEART RATE: 56 BPM | BODY MASS INDEX: 49.08 KG/M2 | HEIGHT: 63 IN

## 2024-05-06 DIAGNOSIS — I48.0 PAROXYSMAL ATRIAL FIBRILLATION (MULTI): ICD-10-CM

## 2024-05-06 DIAGNOSIS — I48.11 LONGSTANDING PERSISTENT ATRIAL FIBRILLATION (MULTI): ICD-10-CM

## 2024-05-06 DIAGNOSIS — Z87.891 FORMER SMOKER: ICD-10-CM

## 2024-05-06 DIAGNOSIS — Z51.81 ANTICOAGULATION MANAGEMENT ENCOUNTER: ICD-10-CM

## 2024-05-06 DIAGNOSIS — R00.2 PALPITATIONS: ICD-10-CM

## 2024-05-06 DIAGNOSIS — Z79.01 ANTICOAGULATION MANAGEMENT ENCOUNTER: ICD-10-CM

## 2024-05-06 DIAGNOSIS — Z79.899 HIGH RISK MEDICATION USE: ICD-10-CM

## 2024-05-06 PROCEDURE — 99214 OFFICE O/P EST MOD 30 MIN: CPT | Performed by: INTERNAL MEDICINE

## 2024-05-06 PROCEDURE — 3074F SYST BP LT 130 MM HG: CPT | Performed by: INTERNAL MEDICINE

## 2024-05-06 PROCEDURE — 93000 ELECTROCARDIOGRAM COMPLETE: CPT | Performed by: INTERNAL MEDICINE

## 2024-05-06 PROCEDURE — 1036F TOBACCO NON-USER: CPT | Performed by: INTERNAL MEDICINE

## 2024-05-06 PROCEDURE — 1159F MED LIST DOCD IN RCRD: CPT | Performed by: INTERNAL MEDICINE

## 2024-05-06 PROCEDURE — 3078F DIAST BP <80 MM HG: CPT | Performed by: INTERNAL MEDICINE

## 2024-05-06 NOTE — PROGRESS NOTES
CARDIOLOGY OFFICE VISIT      CHIEF COMPLAINT  Chief Complaint   Patient presents with    Follow-up       HISTORY OF PRESENT ILLNESS  HPI  74-year-old female with a past medical history of hypertension admitted recently to the hospital for new diagnosis of atrial fibrillation. Patient went for a GOLD guided cardioversion with successful restoration to normal sinus rhythm but patient had recurrence of atrial fibrillation. She was loaded with sotalol 80 mg 1 tablet twice a day and she was brought back again for cardioversion with successful restoration to normal sinus rhythm but she had early recurrence of this arrhythmia. Her dose was increased to 120 mg twice a day and then she was attempted for another cardioversion with successful restoration to normal sinus rhythm but again early recurrence of this arrhythmia.      The dose of the sotalol was increased to 240 mg twice a day and she underwent another cardioversion that happened in June 21, 2019 with successful results.    Echocardiogram performed April 15, 2024  CONCLUSIONS:   1. Left ventricular systolic function is normal with a 60-65% estimated ejection fraction.   2. Currently normal diastolic function.   3. Normal RV size and function      RVSP mildly elevated at 38 mmHg consistent with mild pulmonary hypertension.   4. Normal valve function.    Since the last office visit, she has been doing well.  She denies any symptoms of chest pain or shortness of breath or palpitations.    EKG performed today shows sinus bradycardia at a rate of 54 bpm QRS ration 80 ms QT corrected 460 ms.  Rhythm strip shows the same pattern.           Past Medical History  Past Medical History:   Diagnosis Date    Elevated blood-pressure reading, without diagnosis of hypertension     Elevated blood pressure reading    Encounter for other preprocedural examination     Preop examination    Encounter for other screening for malignant neoplasm of breast     Screening for breast cancer     Encounter for screening for malignant neoplasm of colon 06/17/2020    Colon cancer screening    Personal history of other diseases of the musculoskeletal system and connective tissue     History of arthritis    Personal history of other diseases of the musculoskeletal system and connective tissue     History of back pain    Personal history of other diseases of the respiratory system     History of bronchitis    Personal history of other medical treatment     H/O mammogram    Personal history of other medical treatment     History of screening mammography    Personal history of other mental and behavioral disorders 06/21/2021    History of depression    Personal history of other specified conditions     History of fatigue    Rash and other nonspecific skin eruption     Skin rash       Social History  Social History     Tobacco Use    Smoking status: Former     Types: Cigarettes     Passive exposure: Past    Smokeless tobacco: Never   Vaping Use    Vaping status: Never Used   Substance Use Topics    Alcohol use: Yes     Alcohol/week: 7.0 standard drinks of alcohol     Types: 7 Standard drinks or equivalent per week    Drug use: Never       Family History     Family History   Problem Relation Name Age of Onset    Diabetes Mother      Other (cardiac disorder) Father      Coronary artery disease Father      Esophageal cancer Brother x2     Other (Meningitis) Brother x2         Allergies:  Allergies   Allergen Reactions    Metoprolol Other     Depression, agitation, irritability     Oxcarbazepine Other     Depression     Adhesive Tape-Silicones Rash    Gabapentin Diarrhea    Naproxen Diarrhea        Outpatient Medications:  Current Outpatient Medications   Medication Instructions    acetaminophen (TYLENOL) 500 mg, oral, Every 4 hours PRN    albuterol (ProAir HFA) 90 mcg/actuation inhaler 2 puffs, inhalation, Every 6 hours, As directed     apixaban (ELIQUIS) 5 mg, oral, 2 times daily    escitalopram (LEXAPRO) 20 mg, oral,  Daily    furosemide (Lasix) 40 mg tablet Take 1 1/2 (30 mg)  daily x 4 days then resume 1 tablet daily thereafter. May take additional 1/2 tablet as needed for swelling.    lamoTRIgine (LaMICtal) 100 mg tablet 1 tablet, oral, 2 times daily    MULTIVITAMIN ORAL 1 tablet, oral, Daily    sotalol (BETAPACE) 240 mg, oral, 2 times daily          REVIEW OF SYSTEMS  Review of Systems   All other systems reviewed and are negative.        VITALS  Vitals:    05/06/24 1106   BP: 128/68   Pulse: 56       PHYSICAL EXAM  Constitutional:       Appearance: Healthy appearance. Not in distress.   Neck:      Vascular: No JVR. JVD normal.   Pulmonary:      Effort: Pulmonary effort is normal.      Breath sounds: Normal breath sounds. No wheezing. No rhonchi. No rales.   Chest:      Chest wall: Not tender to palpatation.   Cardiovascular:      PMI at left midclavicular line. Normal rate. Regular rhythm. Normal S1. Normal S2.       Murmurs: There is no murmur.      No gallop.  No click. No rub.   Pulses:     Intact distal pulses.   Edema:     Peripheral edema absent.   Abdominal:      General: Bowel sounds are normal.      Palpations: Abdomen is soft.      Tenderness: There is no abdominal tenderness.   Musculoskeletal: Normal range of motion.         General: No tenderness. Skin:     General: Skin is warm and dry.   Neurological:      General: No focal deficit present.      Mental Status: Alert and oriented to person, place and time.           ASSESSMENT AND PLAN  Clinical impression     1. Shortness of breath, resolved  2. Persistent atrial fibrillation, long-term plan discussed with patient during this office visit, long-term plan of this medication discussed with patient during this office visit  3. High-risk medication (sotalol)  4. Normal left ventricular function per GOLD performed in April 2018. Stable  5. No significant Coronary artery disease status post cardiac catheterization in 2010. Stable  6. long -term anticoagulation  therapy with Eliquis, no evidence of bleeding  7. Hyperlipidemia, controlled  8. Bradycardia, asymptomatic    Plan-recommendations    From the electrophysiology standpoint she is doing well.  Will continue with high risk medication (sotalol) at a dose of 240 mg twice a day.    Continue with anticoagulant therapy.    Will continue watching for possible pacemaker in the near future.  So far she is asymptomatic.    Risk factor modification and lifestyle modification discussed with patient. Diet , exercise and hydration discussed with patient.    I have personally review with patient during this office visit, laboratory data, echocardiogram results, stress test results, Holter-event monitor results prior and after the last electrophysiology visit. All questions has been answered.    Please excuse any errors in grammar or translation related to this dictation.  Voice recognition software was utilized to prepare this document.        Scribe Attestation  By signing my name below, I, Nathalia Barker LPN , Scribe   attest that this documentation has been prepared under the direction and in the presence of Magdy Araya MD.

## 2024-05-06 NOTE — PATIENT INSTRUCTIONS
-Please bring all medicines, vitamins, and herbal supplements with you in original bottles to every appointment!!!!    -Prescriptions will not be filled unless you are compliant with your follow up appointments or have a follow up appointment scheduled as per instruction of your physician. Refills should be requested at the time of your visit.

## 2024-08-30 DIAGNOSIS — I48.0 PAROXYSMAL ATRIAL FIBRILLATION (MULTI): ICD-10-CM

## 2024-08-30 DIAGNOSIS — I48.11 LONGSTANDING PERSISTENT ATRIAL FIBRILLATION (MULTI): ICD-10-CM

## 2024-08-30 NOTE — TELEPHONE ENCOUNTER
Pt called for rx refill of Eliquis to [facility].  Pending appt 11/4/24. E-scribed as requested.

## 2024-10-14 ENCOUNTER — APPOINTMENT (OUTPATIENT)
Dept: PRIMARY CARE | Facility: CLINIC | Age: 74
End: 2024-10-14
Payer: MEDICARE

## 2024-10-14 PROBLEM — I49.5 SINUS NODE DYSFUNCTION (HCC): Status: ACTIVE | Noted: 2023-09-22

## 2024-10-14 PROBLEM — D18.01 HEMANGIOMA OF SKIN AND SUBCUTANEOUS TISSUE: Status: ACTIVE | Noted: 2018-05-23

## 2024-10-14 PROBLEM — M43.17 SPONDYLOLISTHESIS OF LUMBOSACRAL REGION: Status: ACTIVE | Noted: 2023-09-22

## 2024-10-14 PROBLEM — F34.1 PRIMARY DYSTHYMIA: Status: ACTIVE | Noted: 2023-09-22

## 2024-10-14 PROBLEM — L81.4 OTHER MELANIN HYPERPIGMENTATION: Status: ACTIVE | Noted: 2018-05-23

## 2024-10-14 PROBLEM — M16.12 PRIMARY OSTEOARTHRITIS OF LEFT HIP: Status: ACTIVE | Noted: 2023-09-22

## 2024-10-14 PROBLEM — R94.30 ABNORMAL RESULT OF CARDIOVASCULAR FUNCTION STUDY: Status: ACTIVE | Noted: 2023-09-22

## 2024-10-14 PROBLEM — D22.5 MELANOCYTIC NEVI OF TRUNK: Status: ACTIVE | Noted: 2018-05-23

## 2024-10-14 PROBLEM — S33.5XXA LUMBAR SPRAIN: Status: ACTIVE | Noted: 2023-06-29

## 2024-10-14 PROBLEM — M17.12 PRIMARY OSTEOARTHRITIS OF LEFT KNEE: Status: ACTIVE | Noted: 2023-09-22

## 2024-10-18 ENCOUNTER — OFFICE VISIT (OUTPATIENT)
Dept: NEUROLOGY | Age: 74
End: 2024-10-18

## 2024-10-18 VITALS
WEIGHT: 280 LBS | SYSTOLIC BLOOD PRESSURE: 128 MMHG | HEART RATE: 51 BPM | BODY MASS INDEX: 50.56 KG/M2 | DIASTOLIC BLOOD PRESSURE: 86 MMHG

## 2024-10-18 DIAGNOSIS — G40.A09 ABSENCE SEIZURE (HCC): ICD-10-CM

## 2024-10-18 DIAGNOSIS — R56.9 PARTIAL SEIZURES (HCC): Primary | ICD-10-CM

## 2024-10-18 DIAGNOSIS — M47.817 LUMBOSACRAL SPONDYLOSIS WITHOUT MYELOPATHY: ICD-10-CM

## 2024-10-18 DIAGNOSIS — R27.0 ATAXIA: ICD-10-CM

## 2024-10-18 RX ORDER — LAMOTRIGINE 100 MG/1
100 TABLET ORAL 2 TIMES DAILY
Qty: 60 TABLET | Refills: 10 | Status: SHIPPED | OUTPATIENT
Start: 2024-10-18 | End: 2025-09-13

## 2024-10-18 NOTE — PROGRESS NOTES
Subjective:      Patient ID: Krissy Carrillo is a 74 y.o. female who presents today for:  Chief Complaint   Patient presents with    Follow-up     Pt states everything is great. No questions or concerns at this time. No seizures in the last year.        HPI 74-year-old right-handed female with a known history of seizures patient continues on Lamictal 100 mg twice a day.  We see her on a regular basis regarding her seizure medications.  Patient has not any seizures for many years and has remained in generalized good health.  She does have some issues with her back which are not bothersome at this time.  She has not any falls.  Patient requires wheelchair for long distance.  Patient is on Eliquis without any bleeding or bruising.    Past Medical History:   Diagnosis Date    Depression     Hypertension      Past Surgical History:   Procedure Laterality Date    JOINT REPLACEMENT      right hip    JOINT REPLACEMENT      right knee     Social History     Socioeconomic History    Marital status:      Spouse name: Not on file    Number of children: Not on file    Years of education: Not on file    Highest education level: Not on file   Occupational History    Not on file   Tobacco Use    Smoking status: Former     Current packs/day: 0.00     Average packs/day: 0.5 packs/day for 1 year (0.5 ttl pk-yrs)     Types: Cigarettes     Start date: 1968     Quit date: 1968     Years since quittin.9    Smokeless tobacco: Never   Substance and Sexual Activity    Alcohol use: Yes     Alcohol/week: 6.0 standard drinks of alcohol     Types: 6 Glasses of wine per week    Drug use: No    Sexual activity: Not on file   Other Topics Concern    Not on file   Social History Narrative    Not on file     Social Determinants of Health     Financial Resource Strain: Not on file   Food Insecurity: Not on file   Transportation Needs: Not on file   Physical Activity: Not on file   Stress: Not on file   Social Connections: Not on

## 2024-11-04 ENCOUNTER — APPOINTMENT (OUTPATIENT)
Dept: CARDIOLOGY | Facility: CLINIC | Age: 74
End: 2024-11-04
Payer: MEDICARE

## 2024-11-20 DIAGNOSIS — I48.0 PAROXYSMAL ATRIAL FIBRILLATION (MULTI): ICD-10-CM

## 2024-11-20 DIAGNOSIS — I48.11 LONGSTANDING PERSISTENT ATRIAL FIBRILLATION (MULTI): ICD-10-CM

## 2024-11-21 RX ORDER — SOTALOL HYDROCHLORIDE 240 MG/1
240 TABLET ORAL 2 TIMES DAILY
Qty: 180 TABLET | Refills: 0 | Status: SHIPPED | OUTPATIENT
Start: 2024-11-21

## 2024-11-21 NOTE — TELEPHONE ENCOUNTER
Pt left vmm stating she is almost of her Eliquis and Sotalol.  Per [place] sec there is a waiting list to schedule with Dr. Araya. Scripts sent to [facility] in to [facility] and routed to [place] [facility] for follow up/ appt.

## 2024-11-26 ENCOUNTER — OFFICE VISIT (OUTPATIENT)
Dept: FAMILY MEDICINE CLINIC | Age: 74
End: 2024-11-26
Payer: MEDICARE

## 2024-11-26 VITALS
DIASTOLIC BLOOD PRESSURE: 66 MMHG | OXYGEN SATURATION: 96 % | HEIGHT: 62 IN | RESPIRATION RATE: 14 BRPM | HEART RATE: 70 BPM | SYSTOLIC BLOOD PRESSURE: 129 MMHG | BODY MASS INDEX: 51.21 KG/M2

## 2024-11-26 DIAGNOSIS — I10 PRIMARY HYPERTENSION: ICD-10-CM

## 2024-11-26 DIAGNOSIS — Z91.81 AT HIGH RISK FOR FALLS: ICD-10-CM

## 2024-11-26 DIAGNOSIS — I48.0 PAROXYSMAL ATRIAL FIBRILLATION (HCC): ICD-10-CM

## 2024-11-26 DIAGNOSIS — R53.83 FATIGUE, UNSPECIFIED TYPE: ICD-10-CM

## 2024-11-26 DIAGNOSIS — L98.9 SKIN LESION: Primary | ICD-10-CM

## 2024-11-26 LAB
ALBUMIN SERPL-MCNC: 4 G/DL (ref 3.5–4.6)
ALP SERPL-CCNC: 74 U/L (ref 40–130)
ALT SERPL-CCNC: 15 U/L (ref 0–33)
ANION GAP SERPL CALCULATED.3IONS-SCNC: 9 MEQ/L (ref 9–15)
AST SERPL-CCNC: 21 U/L (ref 0–35)
BASOPHILS # BLD: 0.1 K/UL (ref 0–0.2)
BASOPHILS NFR BLD: 0.8 %
BILIRUB SERPL-MCNC: 0.6 MG/DL (ref 0.2–0.7)
BUN SERPL-MCNC: 12 MG/DL (ref 8–23)
CALCIUM SERPL-MCNC: 9.4 MG/DL (ref 8.5–9.9)
CHLORIDE SERPL-SCNC: 98 MEQ/L (ref 95–107)
CHOLEST SERPL-MCNC: 177 MG/DL (ref 0–199)
CO2 SERPL-SCNC: 31 MEQ/L (ref 20–31)
CREAT SERPL-MCNC: 0.78 MG/DL (ref 0.5–0.9)
EOSINOPHIL # BLD: 0.6 K/UL (ref 0–0.7)
EOSINOPHIL NFR BLD: 6.5 %
ERYTHROCYTE [DISTWIDTH] IN BLOOD BY AUTOMATED COUNT: 13.2 % (ref 11.5–14.5)
GLOBULIN SER CALC-MCNC: 2.4 G/DL (ref 2.3–3.5)
GLUCOSE SERPL-MCNC: 114 MG/DL (ref 70–99)
HCT VFR BLD AUTO: 44.3 % (ref 37–47)
HDLC SERPL-MCNC: 53 MG/DL (ref 40–59)
HGB BLD-MCNC: 14.9 G/DL (ref 12–16)
LDLC SERPL CALC-MCNC: 104 MG/DL (ref 0–129)
LYMPHOCYTES # BLD: 3.4 K/UL (ref 1–4.8)
LYMPHOCYTES NFR BLD: 38.1 %
MCH RBC QN AUTO: 30.5 PG (ref 27–31.3)
MCHC RBC AUTO-ENTMCNC: 33.6 % (ref 33–37)
MCV RBC AUTO: 90.6 FL (ref 79.4–94.8)
MONOCYTES # BLD: 0.6 K/UL (ref 0.2–0.8)
MONOCYTES NFR BLD: 7.1 %
NEUTROPHILS # BLD: 4.2 K/UL (ref 1.4–6.5)
NEUTS SEG NFR BLD: 47 %
PLATELET # BLD AUTO: 263 K/UL (ref 130–400)
POTASSIUM SERPL-SCNC: 3.9 MEQ/L (ref 3.4–4.9)
PROT SERPL-MCNC: 6.4 G/DL (ref 6.3–8)
RBC # BLD AUTO: 4.89 M/UL (ref 4.2–5.4)
SODIUM SERPL-SCNC: 138 MEQ/L (ref 135–144)
TRIGL SERPL-MCNC: 101 MG/DL (ref 0–150)
TSH REFLEX: 1.98 UIU/ML (ref 0.44–3.86)
WBC # BLD AUTO: 8.9 K/UL (ref 4.8–10.8)

## 2024-11-26 PROCEDURE — 99214 OFFICE O/P EST MOD 30 MIN: CPT | Performed by: NURSE PRACTITIONER

## 2024-11-26 PROCEDURE — 3074F SYST BP LT 130 MM HG: CPT | Performed by: NURSE PRACTITIONER

## 2024-11-26 PROCEDURE — 3078F DIAST BP <80 MM HG: CPT | Performed by: NURSE PRACTITIONER

## 2024-11-26 PROCEDURE — 1159F MED LIST DOCD IN RCRD: CPT | Performed by: NURSE PRACTITIONER

## 2024-11-26 PROCEDURE — 1160F RVW MEDS BY RX/DR IN RCRD: CPT | Performed by: NURSE PRACTITIONER

## 2024-11-26 PROCEDURE — 1123F ACP DISCUSS/DSCN MKR DOCD: CPT | Performed by: NURSE PRACTITIONER

## 2024-11-26 SDOH — ECONOMIC STABILITY: FOOD INSECURITY: WITHIN THE PAST 12 MONTHS, THE FOOD YOU BOUGHT JUST DIDN'T LAST AND YOU DIDN'T HAVE MONEY TO GET MORE.: NEVER TRUE

## 2024-11-26 SDOH — ECONOMIC STABILITY: FOOD INSECURITY: WITHIN THE PAST 12 MONTHS, YOU WORRIED THAT YOUR FOOD WOULD RUN OUT BEFORE YOU GOT MONEY TO BUY MORE.: NEVER TRUE

## 2024-11-26 SDOH — ECONOMIC STABILITY: INCOME INSECURITY: HOW HARD IS IT FOR YOU TO PAY FOR THE VERY BASICS LIKE FOOD, HOUSING, MEDICAL CARE, AND HEATING?: NOT HARD AT ALL

## 2024-11-26 ASSESSMENT — PATIENT HEALTH QUESTIONNAIRE - PHQ9
SUM OF ALL RESPONSES TO PHQ QUESTIONS 1-9: 1
1. LITTLE INTEREST OR PLEASURE IN DOING THINGS: NOT AT ALL
6. FEELING BAD ABOUT YOURSELF - OR THAT YOU ARE A FAILURE OR HAVE LET YOURSELF OR YOUR FAMILY DOWN: NOT AT ALL
SUM OF ALL RESPONSES TO PHQ QUESTIONS 1-9: 1
SUM OF ALL RESPONSES TO PHQ QUESTIONS 1-9: 1
9. THOUGHTS THAT YOU WOULD BE BETTER OFF DEAD, OR OF HURTING YOURSELF: NOT AT ALL
SUM OF ALL RESPONSES TO PHQ9 QUESTIONS 1 & 2: 0
3. TROUBLE FALLING OR STAYING ASLEEP: NOT AT ALL
10. IF YOU CHECKED OFF ANY PROBLEMS, HOW DIFFICULT HAVE THESE PROBLEMS MADE IT FOR YOU TO DO YOUR WORK, TAKE CARE OF THINGS AT HOME, OR GET ALONG WITH OTHER PEOPLE: NOT DIFFICULT AT ALL
2. FEELING DOWN, DEPRESSED OR HOPELESS: NOT AT ALL
4. FEELING TIRED OR HAVING LITTLE ENERGY: SEVERAL DAYS
7. TROUBLE CONCENTRATING ON THINGS, SUCH AS READING THE NEWSPAPER OR WATCHING TELEVISION: NOT AT ALL
8. MOVING OR SPEAKING SO SLOWLY THAT OTHER PEOPLE COULD HAVE NOTICED. OR THE OPPOSITE, BEING SO FIGETY OR RESTLESS THAT YOU HAVE BEEN MOVING AROUND A LOT MORE THAN USUAL: NOT AT ALL
5. POOR APPETITE OR OVEREATING: NOT AT ALL
SUM OF ALL RESPONSES TO PHQ QUESTIONS 1-9: 1

## 2024-11-26 NOTE — PROGRESS NOTES
Subjective  Krissy Carrillo, 74 y.o. female presents today with:  Chief Complaint   Patient presents with    New Patient       History of Present Illness  The patient presents to [facility] care.    She has a history of atrial fibrillation and is under the care of Dr. Fournier, a cardiologist. She takes blood thinners and reports no excessive bleeding. She does not experience chest pain, palpitations, or dizziness.    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 These are managed with Lamictal, prescribed by Dr. Zheng.    She has undergone right hip and knee replacement surgeries performed by Dr. Zheng and Dr. Ford respectively. She suffers from arthritis in her left hip and knee, with a nodule or spur causing pain during walking. She is considering surgery for this issue.    She has asthma and uses an albuterol inhaler as needed.    She has a history of pulmonary hypertension and is on Lasix 40 mg daily, with an increased dose during periods of swelling.    She is on Lexapro 10 mg for depression. This seems to be working well.     She has had facial skin issues for a long time, which were previously treated by a dermatologist through cauterization.    She is concerned about potential thyroid issues, as her mother developed thyroid problems in her late 70s.    She has not had a colonoscopy due to past negative experiences.    She takes a multivitamin daily.    She has noticed one eyeball protruding more than the other, accompanied by headaches, and has been diagnosed with cataracts. She has had a CT scan of her head in the past.    SOCIAL HISTORY  She is  and has 4 children.    FAMILY HISTORY  Her father had cancer on his forehead. Her mother had thyroid issues and diabetes.      Past Medical History:   Diagnosis Date    Depression     Hypertension      Past Surgical History:   Procedure Laterality Date    JOINT REPLACEMENT      right hip    JOINT REPLACEMENT

## 2024-11-27 LAB — THYROPEROXIDASE IGG SERPL-ACNC: <4 IU/ML (ref 0–25)

## 2024-12-02 ENCOUNTER — APPOINTMENT (OUTPATIENT)
Dept: CARDIOLOGY | Facility: CLINIC | Age: 74
End: 2024-12-02
Payer: MEDICARE

## 2024-12-02 ENCOUNTER — TELEPHONE (OUTPATIENT)
Dept: CARDIOLOGY | Facility: CLINIC | Age: 74
End: 2024-12-02

## 2024-12-02 VITALS
SYSTOLIC BLOOD PRESSURE: 114 MMHG | DIASTOLIC BLOOD PRESSURE: 62 MMHG | WEIGHT: 278.8 LBS | HEART RATE: 72 BPM | HEIGHT: 63 IN | BODY MASS INDEX: 49.4 KG/M2

## 2024-12-02 DIAGNOSIS — Z79.899 HIGH RISK MEDICATION USE: ICD-10-CM

## 2024-12-02 DIAGNOSIS — E78.2 MIXED HYPERLIPIDEMIA: ICD-10-CM

## 2024-12-02 DIAGNOSIS — Z87.891 FORMER SMOKER: ICD-10-CM

## 2024-12-02 DIAGNOSIS — I48.11 LONGSTANDING PERSISTENT ATRIAL FIBRILLATION (MULTI): ICD-10-CM

## 2024-12-02 DIAGNOSIS — I48.0 PAROXYSMAL ATRIAL FIBRILLATION (MULTI): ICD-10-CM

## 2024-12-02 DIAGNOSIS — Z79.01 CHRONIC ANTICOAGULATION: Primary | ICD-10-CM

## 2024-12-02 PROCEDURE — 1160F RVW MEDS BY RX/DR IN RCRD: CPT | Performed by: NURSE PRACTITIONER

## 2024-12-02 PROCEDURE — 3078F DIAST BP <80 MM HG: CPT | Performed by: NURSE PRACTITIONER

## 2024-12-02 PROCEDURE — 93000 ELECTROCARDIOGRAM COMPLETE: CPT | Performed by: NURSE PRACTITIONER

## 2024-12-02 PROCEDURE — 1036F TOBACCO NON-USER: CPT | Performed by: NURSE PRACTITIONER

## 2024-12-02 PROCEDURE — 99214 OFFICE O/P EST MOD 30 MIN: CPT | Performed by: NURSE PRACTITIONER

## 2024-12-02 PROCEDURE — 1159F MED LIST DOCD IN RCRD: CPT | Performed by: NURSE PRACTITIONER

## 2024-12-02 PROCEDURE — 3074F SYST BP LT 130 MM HG: CPT | Performed by: NURSE PRACTITIONER

## 2024-12-02 RX ORDER — FUROSEMIDE 40 MG/1
TABLET ORAL
Qty: 135 TABLET | Refills: 1 | Status: SHIPPED | OUTPATIENT
Start: 2024-12-02

## 2024-12-02 RX ORDER — SOTALOL HYDROCHLORIDE 240 MG/1
240 TABLET ORAL 2 TIMES DAILY
Qty: 180 TABLET | Refills: 1 | Status: SHIPPED | OUTPATIENT
Start: 2024-12-02

## 2024-12-02 RX ORDER — FUROSEMIDE 40 MG/1
TABLET ORAL
Qty: 135 TABLET | Refills: 1 | OUTPATIENT
Start: 2024-12-02

## 2024-12-02 NOTE — PATIENT INSTRUCTIONS
DID YOU KNOW  We have a pharmacy here in the [facility].  They can fill all prescriptions, not just cardiac medications.  Prescriptions from other pharmacies can easily be transferred to the  pharmacy by the  pharmacist on site.   pharmacies offer FREE HOME DELIVERY on medications.   Typically prescriptions can be ready in 10 - 15 minutes. Pharmacy phone # [phone].

## 2024-12-02 NOTE — PROGRESS NOTES
Megan Guerra is a 74 y.o. female that presents to the office today for cardiac follow up. She follows with  Dr. Araya  and was added to my schedule today.      Per Dr. Araya office notes, she has a PMH of hypertension admitted recently to the hospital for new diagnosis of atrial fibrillation. Patient went for a GOLD guided cardioversion with successful restoration to normal sinus rhythm but patient had recurrence of atrial fibrillation. She was loaded with sotalol 80 mg 1 tablet twice a day and she was brought back again for cardioversion with successful restoration to normal sinus rhythm but she had early recurrence of this arrhythmia. Her dose was increased to 120 mg twice a day and then she was attempted for another cardioversion with successful restoration to normal sinus rhythm but again early recurrence of this arrhythmia.      The dose of the sotalol was increased to 240 mg twice a day and she underwent another cardioversion that happened in June 21, 2019 with successful results.    Overall she states that she is doing well and is without any specific cardiac complaints.     Testing Reviewed  EKG obtained in the office today and verified with Dr. Blevins shows SR/wandering atrial pacemaker HR 69  bpm, QT/Qtc  418/447    Assessment/Plan  Persistent atrial fibrillation:  EKG as noted above,  High Risk medication:  continue Sotalol  Anticoagulation: Denies bleeding.  Continue Eliquis  Refills provided in office today  Follow with Dr. Araya in 6 months or sooner if needed.       Patient Active Problem List   Diagnosis    Abnormal results of cardiovascular function studies    Absence seizure (Multi)    Actinic keratosis    Arthralgia of multiple sites    Arthropathy of multiple sites    Chronic back pain    Contusion of scalp    Depression    Essential hypertension    Hemangioma of skin and subcutaneous tissue    Hyperlipidemia    Lumbar sprain    Melanocytic nevi of trunk    Other melanin hyperpigmentation     Palpitations    Atrial fibrillation (Multi)    Paroxysmal atrial fibrillation (Multi)    Pedal edema    Primary dysthymia    Primary osteoarthritis of left hip    Primary osteoarthritis of left knee    Pulmonary hypertension (Multi)    Dyspnea    Mild intermittent asthma without complication (HHS-HCC)    Sinus node dysfunction (Multi)    Spondylolisthesis of lumbosacral region    Stage 2 chronic kidney disease    Vitamin D deficiency    Former smoker    High risk medication use    Class 3 severe obesity due to excess calories with serious comorbidity and body mass index (BMI) of 45.0 to 49.9 in adult    Chronic anticoagulation       Social History     Tobacco Use    Smoking status: Former     Types: Cigarettes     Passive exposure: Past    Smokeless tobacco: Never   Vaping Use    Vaping status: Never Used   Substance Use Topics    Alcohol use: Yes     Alcohol/week: 7.0 standard drinks of alcohol     Types: 7 Standard drinks or equivalent per week    Drug use: Never       Past Medical History:   Diagnosis Date    Elevated blood-pressure reading, without diagnosis of hypertension     Elevated blood pressure reading    Encounter for other preprocedural examination     Preop examination    Encounter for other screening for malignant neoplasm of breast     Screening for breast cancer    Encounter for screening for malignant neoplasm of colon 06/17/2020    Colon cancer screening    Personal history of other diseases of the musculoskeletal system and connective tissue     History of arthritis    Personal history of other diseases of the musculoskeletal system and connective tissue     History of back pain    Personal history of other diseases of the respiratory system     History of bronchitis    Personal history of other medical treatment     H/O mammogram    Personal history of other medical treatment     History of screening mammography    Personal history of other mental and behavioral disorders 06/21/2021    History  of depression    Personal history of other specified conditions     History of fatigue    Rash and other nonspecific skin eruption     Skin rash         Current Outpatient Medications:     acetaminophen (Tylenol) 500 mg tablet, Take 1 tablet (500 mg) by mouth every 4 hours if needed., Disp: , Rfl:     albuterol (ProAir HFA) 90 mcg/actuation inhaler, Inhale 2 puffs every 6 hours. As directed, Disp: 18 g, Rfl: 3    escitalopram (Lexapro) 20 mg tablet, Take 1 tablet (20 mg) by mouth once daily. (Patient taking differently: Take 0.5 tablets (10 mg) by mouth once daily.), Disp: 90 tablet, Rfl: 3    lamoTRIgine (LaMICtal) 100 mg tablet, Take 1 tablet (100 mg) by mouth 2 times a day., Disp: , Rfl:     apixaban (Eliquis) 5 mg tablet, Take 1 tablet (5 mg) by mouth 2 times a day., Disp: 60 tablet, Rfl: 6    furosemide (Lasix) 40 mg tablet, Take 1 1/2 (30 mg)  daily x 4 days then resume 1 tablet daily thereafter. May take additional 1/2 tablet as needed for swelling., Disp: 135 tablet, Rfl: 1    sotalol (Betapace) 240 mg tablet, Take 1 tablet (240 mg) by mouth 2 times a day., Disp: 180 tablet, Rfl: 1    Metoprolol, Oxcarbazepine, Adhesive tape-silicones, Gabapentin, and Naproxen    Family History   Problem Relation Name Age of Onset    Diabetes Mother      Other (cardiac disorder) Father      Coronary artery disease Father      Esophageal cancer Brother x2     Other (Meningitis) Brother x2        Past Surgical History:   Procedure Laterality Date    OTHER SURGICAL HISTORY  02/14/2022    Colonoscopy    OTHER SURGICAL HISTORY  11/25/2019    Hip replacement    OTHER SURGICAL HISTORY  11/25/2019    Knee replacement          Review of systems  Constitutional: No weight loss, fever, chills, weakness or fatigue  HEENT: No visual loss, blurred vision, double vision or yellow sclerae  Skin: No rash or itching  Cardiovascular: No chest pain, pressure or discomfort, No palpitations or edema.  Respiratory: No shortness of breath, cough or  "sputum  Gastrointestinal: No nausea, vomiting or diarrhea. No bloody or dark tarry stools.  Neurological: No headache, lightheadedness, dizziness, syncope.   Musculoskeletal: No muscle, back pain, joint pain or stiffness.  Hematologic: No anemia, bleeding or bruising.    /62 (BP Location: Left arm, Patient Position: Sitting)   Pulse 72   Ht 1.6 m (5' 3\")   Wt 126 kg (278 lb 12.8 oz)   BMI 49.39 kg/m²     Patient Active Problem List   Diagnosis    Abnormal results of cardiovascular function studies    Absence seizure (Multi)    Actinic keratosis    Arthralgia of multiple sites    Arthropathy of multiple sites    Chronic back pain    Contusion of scalp    Depression    Essential hypertension    Hemangioma of skin and subcutaneous tissue    Hyperlipidemia    Lumbar sprain    Melanocytic nevi of trunk    Other melanin hyperpigmentation    Palpitations    Atrial fibrillation (Multi)    Paroxysmal atrial fibrillation (Multi)    Pedal edema    Primary dysthymia    Primary osteoarthritis of left hip    Primary osteoarthritis of left knee    Pulmonary hypertension (Multi)    Dyspnea    Mild intermittent asthma without complication (HHS-HCC)    Sinus node dysfunction (Multi)    Spondylolisthesis of lumbosacral region    Stage 2 chronic kidney disease    Vitamin D deficiency    Former smoker    High risk medication use    Class 3 severe obesity due to excess calories with serious comorbidity and body mass index (BMI) of 45.0 to 49.9 in adult    Chronic anticoagulation         Physical Exam  Constitutional: Well developed, awake/alert x 3, no distress.  Head/Neck: No JVD, No bruits  Respiratory/Thorax: patent airways, CTAB, normal breath sounds with good expansion.  Cardiovascular: Regular rate and rhythm, no murmurs, normal S1 and S2,   Gastrointestinal: Non distended, soft, non-tender, no rebound tenderness or guarding.  Extremities: No cyanosis, edema.    Neurological: Alert and oriented x 3. Moves extremities " spontaneous with purpose.  Psychological: Appropriate mood and behavior  Skin: Warm and Dry. No lesions or rashes.         Please excuse any errors in grammar or translation related to dictation, voice recognition software was used to prepare this document.

## 2024-12-03 NOTE — TELEPHONE ENCOUNTER
Pharmacy called to verify instructions in regards to the patients Lasix. Please advise.   
Spoke with pharmacist and gave verbal order. New RX to be filled for patient.   Kelley Avalos MA   
(0) independent

## 2025-01-02 NOTE — TELEPHONE ENCOUNTER
MEDICATION REFILL REQUEST     Rx Requested    Requested Prescriptions     Pending Prescriptions Disp Refills    escitalopram (LEXAPRO) 10 MG tablet 30 tablet      Sig: Take 1 tablet by mouth daily         Patient's Last Office Visit   11/26/2024      Patient's Next Office Visit   Future Appointments   Date Time Provider Department Center   5/27/2025  1:00 PM Katharine Hale APRN - CNP [facility]   10/17/2025 10:00 AM Lambert Zheng MD [facility] -         Other comments

## 2025-01-03 RX ORDER — ESCITALOPRAM OXALATE 10 MG/1
10 TABLET ORAL DAILY
Qty: 30 TABLET | Refills: 5 | Status: SHIPPED | OUTPATIENT
Start: 2025-01-03

## 2025-04-30 ENCOUNTER — APPOINTMENT (OUTPATIENT)
Dept: ORTHOPEDIC SURGERY | Facility: CLINIC | Age: 75
End: 2025-04-30
Payer: MEDICARE

## 2025-05-24 ASSESSMENT — PATIENT HEALTH QUESTIONNAIRE - PHQ9
10. IF YOU CHECKED OFF ANY PROBLEMS, HOW DIFFICULT HAVE THESE PROBLEMS MADE IT FOR YOU TO DO YOUR WORK, TAKE CARE OF THINGS AT HOME, OR GET ALONG WITH OTHER PEOPLE: NOT DIFFICULT AT ALL
2. FEELING DOWN, DEPRESSED OR HOPELESS: NOT AT ALL
SUM OF ALL RESPONSES TO PHQ QUESTIONS 1-9: 2
7. TROUBLE CONCENTRATING ON THINGS, SUCH AS READING THE NEWSPAPER OR WATCHING TELEVISION: SEVERAL DAYS
3. TROUBLE FALLING OR STAYING ASLEEP: SEVERAL DAYS
4. FEELING TIRED OR HAVING LITTLE ENERGY: NOT AT ALL
9. THOUGHTS THAT YOU WOULD BE BETTER OFF DEAD, OR OF HURTING YOURSELF: NOT AT ALL
2. FEELING DOWN, DEPRESSED OR HOPELESS: NOT AT ALL
10. IF YOU CHECKED OFF ANY PROBLEMS, HOW DIFFICULT HAVE THESE PROBLEMS MADE IT FOR YOU TO DO YOUR WORK, TAKE CARE OF THINGS AT HOME, OR GET ALONG WITH OTHER PEOPLE: NOT DIFFICULT AT ALL
6. FEELING BAD ABOUT YOURSELF - OR THAT YOU ARE A FAILURE OR HAVE LET YOURSELF OR YOUR FAMILY DOWN: NOT AT ALL
6. FEELING BAD ABOUT YOURSELF - OR THAT YOU ARE A FAILURE OR HAVE LET YOURSELF OR YOUR FAMILY DOWN: NOT AT ALL
SUM OF ALL RESPONSES TO PHQ QUESTIONS 1-9: 2
7. TROUBLE CONCENTRATING ON THINGS, SUCH AS READING THE NEWSPAPER OR WATCHING TELEVISION: SEVERAL DAYS
1. LITTLE INTEREST OR PLEASURE IN DOING THINGS: NOT AT ALL
SUM OF ALL RESPONSES TO PHQ QUESTIONS 1-9: 2
5. POOR APPETITE OR OVEREATING: NOT AT ALL
1. LITTLE INTEREST OR PLEASURE IN DOING THINGS: NOT AT ALL
9. THOUGHTS THAT YOU WOULD BE BETTER OFF DEAD, OR OF HURTING YOURSELF: NOT AT ALL
5. POOR APPETITE OR OVEREATING: NOT AT ALL
3. TROUBLE FALLING OR STAYING ASLEEP: SEVERAL DAYS
8. MOVING OR SPEAKING SO SLOWLY THAT OTHER PEOPLE COULD HAVE NOTICED. OR THE OPPOSITE - BEING SO FIDGETY OR RESTLESS THAT YOU HAVE BEEN MOVING AROUND A LOT MORE THAN USUAL: NOT AT ALL
4. FEELING TIRED OR HAVING LITTLE ENERGY: NOT AT ALL
SUM OF ALL RESPONSES TO PHQ QUESTIONS 1-9: 2
SUM OF ALL RESPONSES TO PHQ QUESTIONS 1-9: 2
8. MOVING OR SPEAKING SO SLOWLY THAT OTHER PEOPLE COULD HAVE NOTICED. OR THE OPPOSITE, BEING SO FIGETY OR RESTLESS THAT YOU HAVE BEEN MOVING AROUND A LOT MORE THAN USUAL: NOT AT ALL

## 2025-05-27 ENCOUNTER — OFFICE VISIT (OUTPATIENT)
Age: 75
End: 2025-05-27
Payer: MEDICARE

## 2025-05-27 VITALS
WEIGHT: 271.4 LBS | SYSTOLIC BLOOD PRESSURE: 118 MMHG | OXYGEN SATURATION: 96 % | DIASTOLIC BLOOD PRESSURE: 62 MMHG | HEART RATE: 68 BPM | HEIGHT: 62 IN | TEMPERATURE: 97.4 F | BODY MASS INDEX: 49.94 KG/M2

## 2025-05-27 DIAGNOSIS — I48.0 PAROXYSMAL ATRIAL FIBRILLATION (HCC): ICD-10-CM

## 2025-05-27 DIAGNOSIS — Z83.49 FAMILY HISTORY OF THYROID DISEASE: ICD-10-CM

## 2025-05-27 DIAGNOSIS — Z00.00 INITIAL MEDICARE ANNUAL WELLNESS VISIT: Primary | ICD-10-CM

## 2025-05-27 DIAGNOSIS — R73.9 HYPERGLYCEMIA: ICD-10-CM

## 2025-05-27 DIAGNOSIS — I27.20 PULMONARY HYPERTENSION (HCC): ICD-10-CM

## 2025-05-27 DIAGNOSIS — R56.9 PARTIAL SEIZURES (HCC): ICD-10-CM

## 2025-05-27 LAB
ANION GAP SERPL CALCULATED.3IONS-SCNC: 10 MEQ/L (ref 9–15)
BUN SERPL-MCNC: 10 MG/DL (ref 8–23)
CALCIUM SERPL-MCNC: 9.4 MG/DL (ref 8.5–9.9)
CHLORIDE SERPL-SCNC: 99 MEQ/L (ref 95–107)
CO2 SERPL-SCNC: 30 MEQ/L (ref 20–31)
CREAT SERPL-MCNC: 0.86 MG/DL (ref 0.5–0.9)
GLUCOSE SERPL-MCNC: 117 MG/DL (ref 70–99)
POTASSIUM SERPL-SCNC: 4.5 MEQ/L (ref 3.4–4.9)
SODIUM SERPL-SCNC: 139 MEQ/L (ref 135–144)
TSH REFLEX: 2.17 UIU/ML (ref 0.44–3.86)

## 2025-05-27 PROCEDURE — G0438 PPPS, INITIAL VISIT: HCPCS | Performed by: NURSE PRACTITIONER

## 2025-05-27 PROCEDURE — 1123F ACP DISCUSS/DSCN MKR DOCD: CPT | Performed by: NURSE PRACTITIONER

## 2025-05-27 PROCEDURE — 3078F DIAST BP <80 MM HG: CPT | Performed by: NURSE PRACTITIONER

## 2025-05-27 PROCEDURE — 3074F SYST BP LT 130 MM HG: CPT | Performed by: NURSE PRACTITIONER

## 2025-05-27 PROCEDURE — 1159F MED LIST DOCD IN RCRD: CPT | Performed by: NURSE PRACTITIONER

## 2025-05-27 PROCEDURE — 1160F RVW MEDS BY RX/DR IN RCRD: CPT | Performed by: NURSE PRACTITIONER

## 2025-05-27 RX ORDER — FLUOROURACIL 50 MG/G
CREAM TOPICAL
COMMUNITY
Start: 2025-05-20

## 2025-05-27 SDOH — ECONOMIC STABILITY: FOOD INSECURITY: WITHIN THE PAST 12 MONTHS, THE FOOD YOU BOUGHT JUST DIDN'T LAST AND YOU DIDN'T HAVE MONEY TO GET MORE.: NEVER TRUE

## 2025-05-27 SDOH — ECONOMIC STABILITY: FOOD INSECURITY: WITHIN THE PAST 12 MONTHS, YOU WORRIED THAT YOUR FOOD WOULD RUN OUT BEFORE YOU GOT MONEY TO BUY MORE.: NEVER TRUE

## 2025-05-27 ASSESSMENT — LIFESTYLE VARIABLES
HOW OFTEN DURING THE LAST YEAR HAVE YOU FAILED TO DO WHAT WAS NORMALLY EXPECTED FROM YOU BECAUSE OF DRINKING: NEVER
HOW MANY STANDARD DRINKS CONTAINING ALCOHOL DO YOU HAVE ON A TYPICAL DAY: 1 OR 2
HOW OFTEN DURING THE LAST YEAR HAVE YOU HAD A FEELING OF GUILT OR REMORSE AFTER DRINKING: NEVER
HAS A RELATIVE, FRIEND, DOCTOR, OR ANOTHER HEALTH PROFESSIONAL EXPRESSED CONCERN ABOUT YOUR DRINKING OR SUGGESTED YOU CUT DOWN: NO
HOW OFTEN DURING THE LAST YEAR HAVE YOU NEEDED AN ALCOHOLIC DRINK FIRST THING IN THE MORNING TO GET YOURSELF GOING AFTER A NIGHT OF HEAVY DRINKING: NEVER
HOW OFTEN DURING THE LAST YEAR HAVE YOU BEEN UNABLE TO REMEMBER WHAT HAPPENED THE NIGHT BEFORE BECAUSE YOU HAD BEEN DRINKING: NEVER
HOW OFTEN DO YOU HAVE A DRINK CONTAINING ALCOHOL: 4 OR MORE TIMES A WEEK
HAVE YOU OR SOMEONE ELSE BEEN INJURED AS A RESULT OF YOUR DRINKING: NO
HOW OFTEN DURING THE LAST YEAR HAVE YOU FOUND THAT YOU WERE NOT ABLE TO STOP DRINKING ONCE YOU HAD STARTED: NEVER

## 2025-05-27 ASSESSMENT — PATIENT HEALTH QUESTIONNAIRE - PHQ9
6. FEELING BAD ABOUT YOURSELF - OR THAT YOU ARE A FAILURE OR HAVE LET YOURSELF OR YOUR FAMILY DOWN: NOT AT ALL
SUM OF ALL RESPONSES TO PHQ QUESTIONS 1-9: 1
2. FEELING DOWN, DEPRESSED OR HOPELESS: NOT AT ALL
9. THOUGHTS THAT YOU WOULD BE BETTER OFF DEAD, OR OF HURTING YOURSELF: NOT AT ALL
4. FEELING TIRED OR HAVING LITTLE ENERGY: NOT AT ALL
8. MOVING OR SPEAKING SO SLOWLY THAT OTHER PEOPLE COULD HAVE NOTICED. OR THE OPPOSITE, BEING SO FIGETY OR RESTLESS THAT YOU HAVE BEEN MOVING AROUND A LOT MORE THAN USUAL: NOT AT ALL
SUM OF ALL RESPONSES TO PHQ QUESTIONS 1-9: 1
SUM OF ALL RESPONSES TO PHQ QUESTIONS 1-9: 1
10. IF YOU CHECKED OFF ANY PROBLEMS, HOW DIFFICULT HAVE THESE PROBLEMS MADE IT FOR YOU TO DO YOUR WORK, TAKE CARE OF THINGS AT HOME, OR GET ALONG WITH OTHER PEOPLE: NOT DIFFICULT AT ALL
SUM OF ALL RESPONSES TO PHQ QUESTIONS 1-9: 1
3. TROUBLE FALLING OR STAYING ASLEEP: SEVERAL DAYS
7. TROUBLE CONCENTRATING ON THINGS, SUCH AS READING THE NEWSPAPER OR WATCHING TELEVISION: NOT AT ALL
5. POOR APPETITE OR OVEREATING: NOT AT ALL
1. LITTLE INTEREST OR PLEASURE IN DOING THINGS: NOT AT ALL

## 2025-05-27 NOTE — PROGRESS NOTES
Medicare Annual Wellness Visit    Krissy Carrillo is here for Medicare AWV (Patient presents today for subsequent awv.) and Hypertension    Assessment & Plan   Initial Medicare annual wellness visit  Pulmonary hypertension (HCC)  -     Basic Metabolic Panel; Future  Paroxysmal atrial fibrillation (HCC)  -     Basic Metabolic Panel; Future  -     Vitamin B12 & Folate; Future  Partial seizures (HCC)  -     Basic Metabolic Panel; Future  Family history of thyroid disease  -     TSH reflex to FT4; Future  -     Vitamin B12 & Folate; Future  Hyperglycemia  -     Basic Metabolic Panel; Future  -     Hemoglobin A1C; Future  -     Vitamin B12 & Folate; Future       Return in about 6 months (around 11/27/2025).     Subjective   The following acute and/or chronic problems were also addressed today:      Patient's complete Health Risk Assessment and screening values have been reviewed and are found in Flowsheets. The following problems were reviewed today and where indicated follow up appointments were made and/or referrals ordered.    Positive Risk Factor Screenings with Interventions:    Fall Risk:  Do you feel unsteady or are you worried about falling? : (!) yes  2 or more falls in past year?: (!) yes  Fall with injury in past year?: no     Interventions:    Reviewed medications, home hazards, visual acuity, and co-morbidities that can increase risk for falls            General HRA Questions:  Select all that apply: (!) New or Increased Pain  Interventions - Pain:  Knee pain      Inactivity:  On average, how many days per week do you engage in moderate to strenuous exercise (like a brisk walk)?: 0 days (!) Abnormal  On average, how many minutes do you engage in exercise at this level?: 0 min  Interventions:  Limited mobility     Abnormal BMI (obese):  Body mass index is 49.64 kg/m². (!) Abnormal  Interventions:              ADL's:   Patient reports needing help with:  Select all that apply: (!) Housekeeping,

## 2025-05-28 LAB
ESTIMATED AVERAGE GLUCOSE: 120 MG/DL
FOLATE: >40 NG/ML (ref 4.8–24.2)
HBA1C MFR BLD: 5.8 % (ref 4–6)
VITAMIN B-12: 583 PG/ML (ref 232–1245)

## 2025-05-30 ENCOUNTER — RESULTS FOLLOW-UP (OUTPATIENT)
Age: 75
End: 2025-05-30

## 2025-06-02 ENCOUNTER — APPOINTMENT (OUTPATIENT)
Dept: CARDIOLOGY | Facility: CLINIC | Age: 75
End: 2025-06-02
Payer: MEDICARE

## 2025-06-02 VITALS
SYSTOLIC BLOOD PRESSURE: 124 MMHG | DIASTOLIC BLOOD PRESSURE: 76 MMHG | BODY MASS INDEX: 47.24 KG/M2 | HEIGHT: 63 IN | WEIGHT: 266.6 LBS | HEART RATE: 74 BPM

## 2025-06-02 DIAGNOSIS — Z79.01 CHRONIC ANTICOAGULATION: ICD-10-CM

## 2025-06-02 DIAGNOSIS — Z79.899 HIGH RISK MEDICATION USE: ICD-10-CM

## 2025-06-02 DIAGNOSIS — I48.0 PAROXYSMAL ATRIAL FIBRILLATION (MULTI): ICD-10-CM

## 2025-06-02 DIAGNOSIS — Z87.891 FORMER SMOKER: ICD-10-CM

## 2025-06-02 DIAGNOSIS — I48.11 LONGSTANDING PERSISTENT ATRIAL FIBRILLATION (MULTI): ICD-10-CM

## 2025-06-02 DIAGNOSIS — E78.2 MIXED HYPERLIPIDEMIA: ICD-10-CM

## 2025-06-02 PROCEDURE — 1036F TOBACCO NON-USER: CPT | Performed by: INTERNAL MEDICINE

## 2025-06-02 PROCEDURE — 1159F MED LIST DOCD IN RCRD: CPT | Performed by: INTERNAL MEDICINE

## 2025-06-02 PROCEDURE — 93000 ELECTROCARDIOGRAM COMPLETE: CPT | Performed by: INTERNAL MEDICINE

## 2025-06-02 PROCEDURE — 3078F DIAST BP <80 MM HG: CPT | Performed by: INTERNAL MEDICINE

## 2025-06-02 PROCEDURE — 99215 OFFICE O/P EST HI 40 MIN: CPT | Performed by: INTERNAL MEDICINE

## 2025-06-02 PROCEDURE — 3074F SYST BP LT 130 MM HG: CPT | Performed by: INTERNAL MEDICINE

## 2025-06-02 RX ORDER — SOTALOL HYDROCHLORIDE 240 MG/1
240 TABLET ORAL 2 TIMES DAILY
Qty: 180 TABLET | Refills: 1 | Status: SHIPPED | OUTPATIENT
Start: 2025-06-02

## 2025-06-02 RX ORDER — FLUOROURACIL 50 MG/G
CREAM TOPICAL
COMMUNITY
Start: 2025-05-20

## 2025-06-02 NOTE — PROGRESS NOTES
CARDIOLOGY OFFICE VISIT      CHIEF COMPLAINT  Chief Complaint   Patient presents with    Follow-up     FOLLOW UP- 6 M       HISTORY OF PRESENT ILLNESS  HPI  75-year-old female with a past medical history of hypertension admitted recently to the hospital for new diagnosis of atrial fibrillation. Patient went for a GOLD guided cardioversion with successful restoration to normal sinus rhythm but patient had recurrence of atrial fibrillation. She was loaded with sotalol 80 mg 1 tablet twice a day and she was brought back again for cardioversion with successful restoration to normal sinus rhythm but she had early recurrence of this arrhythmia. Her dose was increased to 120 mg twice a day and then she was attempted for another cardioversion with successful restoration to normal sinus rhythm but again early recurrence of this arrhythmia.      The dose of the sotalol was increased to 240 mg twice a day and she underwent another cardioversion that happened in June 21, 2019 with successful results.     Echocardiogram performed April 15, 2024  CONCLUSIONS:   1. Left ventricular systolic function is normal with a 60-65% estimated ejection fraction.   2. Currently normal diastolic function.   3. Normal RV size and function      RVSP mildly elevated at 38 mmHg consistent with mild pulmonary hypertension.   4. Normal valve function.    Patient states that for the last 2 to 3 months she has been noticing getting more tired and fatigued with cardiac activities.  No chest pain.  No palpitations.    Patient was recently diagnosed with skin cancer a month ago.  She underwent removal of the skin cancer in her forehead.  So far receiving topical therapy.    EKG performed today shows atrial fibrillation rate of 74 bpm QRS ration 80 ms QT corrected 440 ms.  Rhythm strip shows the same pattern.      Past Medical History  Medical History[1]    Social History  Social History[2]    Family History   Family History[3]     Allergies:  RX  Allergies[4]     Outpatient Medications:  Current Outpatient Medications   Medication Instructions    acetaminophen (TYLENOL) 500 mg, Every 4 hours PRN    albuterol (ProAir HFA) 90 mcg/actuation inhaler 2 puffs, inhalation, Every 6 hours, As directed     apixaban (ELIQUIS) 5 mg, oral, 2 times daily    escitalopram (LEXAPRO) 20 mg, oral, Daily    fluorouracil (Efudex) 5 % cream cream APPLY A THIN LAYER TO AFFECTED AREA ON LEFT EYEBROW 1 TIME DAILY FOR 3 TO 4 WEEKS. ALSO USE ON OTHER SCALY AREAS    furosemide (Lasix) 40 mg tablet Take 1 1/2 (30 mg)  daily x 4 days then resume 1 tablet daily thereafter. May take additional 1/2 tablet as needed for swelling.    lamoTRIgine (LaMICtal) 100 mg tablet 1 tablet, 2 times daily    sotalol (BETAPACE) 240 mg, oral, 2 times daily          REVIEW OF SYSTEMS  Review of Systems   All other systems reviewed and are negative.        VITALS  Vitals:    06/02/25 1057   BP: 124/76   Pulse: 74       PHYSICAL EXAM  Constitutional:       Appearance: Healthy appearance. Not in distress.   Neck:      Vascular: No JVR. JVD normal.   Pulmonary:      Effort: Pulmonary effort is normal.      Breath sounds: Normal breath sounds. No wheezing. No rhonchi. No rales.   Chest:      Chest wall: Not tender to palpatation.   Cardiovascular:      PMI at left midclavicular line. Normal rate. Irregularly irregular rhythm. Normal S1. Normal S2.       Murmurs: There is no murmur.      No gallop.  No click. No rub.   Pulses:     Intact distal pulses.   Edema:     Peripheral edema absent.   Abdominal:      General: Bowel sounds are normal.      Palpations: Abdomen is soft.      Tenderness: There is no abdominal tenderness.   Musculoskeletal: Normal range of motion.         General: No tenderness. Skin:     General: Skin is warm and dry.   Neurological:      General: No focal deficit present.      Mental Status: Alert and oriented to person, place and time.           ASSESSMENT AND PLAN    Clinical impression      1. Shortness of breath, resolved  2. Persistent atrial fibrillation, long-term plan discussed with patient during this office visit, long-term plan of this medication discussed with patient during this office visit  3. High-risk medication (sotalol)  4. Normal left ventricular function per GOLD performed in April 2018. Stable  5. No significant Coronary artery disease status post cardiac catheterization in 2010. Stable  6. long -term anticoagulation therapy with Eliquis, no evidence of bleeding  7. Hyperlipidemia, controlled  8. Bradycardia, asymptomatic    Plan recommendations    Patient had recurrence of atrial fibrillation.  We will offer her electrical cardioversion  Procedure, risk, benefits and possible complications were explained to patient.  All questions were answered.  Patient agrees with plan.  Informed consent was signed.    We will keep the same dose of sotalol therapy.    Continue with Eliquis therapy.    If she has recurrence of atrial fibrillation post cardioversion, we can discuss again option of rate control versus rhythm control strategy.    Follow my office 2 to 4 weeks post cardioversion or sooner if needed.    Risk factor modification and lifestyle modification discussed with patient. Diet , exercise and hydration discussed with patient.    I have personally review with patient during this office visit, laboratory data, echocardiogram results, stress test results, Holter-event monitor results prior and after the last electrophysiology visit. All questions has been answered.    Please excuse any errors in grammar or translation related to this dictation.  Voice recognition software was utilized to prepare this document.      I, Dr. Araya, personally performed the services described in the documentation as scribed by the nurse in my presence, and confirm it is both accurate and complete.     Scribe Attestation  By signing my name below, INathalia LPN , Scribe   attest that this  documentation has been prepared under the direction and in the presence of Magdy Araya MD         [1]   Past Medical History:  Diagnosis Date    Elevated blood-pressure reading, without diagnosis of hypertension     Elevated blood pressure reading    Encounter for other preprocedural examination     Preop examination    Encounter for other screening for malignant neoplasm of breast     Screening for breast cancer    Encounter for screening for malignant neoplasm of colon 06/17/2020    Colon cancer screening    Personal history of other diseases of the musculoskeletal system and connective tissue     History of arthritis    Personal history of other diseases of the musculoskeletal system and connective tissue     History of back pain    Personal history of other diseases of the respiratory system     History of bronchitis    Personal history of other medical treatment     H/O mammogram    Personal history of other medical treatment     History of screening mammography    Personal history of other mental and behavioral disorders 06/21/2021    History of depression    Personal history of other specified conditions     History of fatigue    Rash and other nonspecific skin eruption     Skin rash   [2]   Social History  Tobacco Use    Smoking status: Former     Types: Cigarettes     Passive exposure: Past    Smokeless tobacco: Never   Vaping Use    Vaping status: Never Used   Substance Use Topics    Alcohol use: Yes     Alcohol/week: 7.0 standard drinks of alcohol     Types: 7 Standard drinks or equivalent per week    Drug use: Never   [3]   Family History  Problem Relation Name Age of Onset    Diabetes Mother      Other (cardiac disorder) Father      Coronary artery disease Father      Esophageal cancer Brother x2     Other (Meningitis) Brother x2    [4]   Allergies  Allergen Reactions    Metoprolol Other     Depression, agitation, irritability     Oxcarbazepine Other     Depression     Adhesive Tape-Silicones Rash     Gabapentin Diarrhea    Naproxen Diarrhea

## 2025-06-02 NOTE — PATIENT INSTRUCTIONS
Follow up 4-6 weeks post cardioversion with MAY Bull       You will be scheduled for cardioversion with .  Please make sure you are taking your anticoagulation as directed and do not hold any doses prior to your procedure.      Non-fasting labs to be done approximately 2-3 days prior.  We are a paperless office.  The order is in the computer and you can go to any  lab to have done. You can always ask for order to be printed if you'd like to go to outside lab.      Do not eat or drink anything for 12 hours prior to your procedure unless directed otherwise.     You will need to have someone drive your to the hospital for the procedure and drive you home afterwards.       DID YOU KNOW  We have a pharmacy here in the [facility].  They can fill all prescriptions, not just cardiac medications.  Prescriptions from other pharmacies can easily be transferred to the  pharmacy by the  pharmacist on site.   pharmacies offer FREE HOME DELIVERY on medications to anywhere in [place]. They can sync your medications. Typically prescriptions can be ready in 10 - 15 minutes. If pharmacy is unable to fill your  prescription or if cost is more than your paying now the Pharmacist can easily transfer back to your Pharmacy of choice. Pharmacy phone # [phone].     Please bring all medicines, vitamins, and herbal supplements with you in original bottles to every appointment!!!!    Prescriptions will not be filled unless you are compliant with your follow up appointments or have a follow up appointment scheduled as per instruction of your physician. Refills should be requested at the time of your visit.

## 2025-06-09 ENCOUNTER — TELEPHONE (OUTPATIENT)
Dept: CARDIOLOGY | Facility: HOSPITAL | Age: 75
End: 2025-06-09

## 2025-06-10 ENCOUNTER — HOSPITAL ENCOUNTER (OUTPATIENT)
Dept: CARDIOLOGY | Facility: HOSPITAL | Age: 75
Discharge: HOME | End: 2025-06-10
Payer: MEDICARE

## 2025-06-10 ENCOUNTER — ANESTHESIA (OUTPATIENT)
Dept: CARDIOLOGY | Facility: HOSPITAL | Age: 75
End: 2025-06-10
Payer: MEDICARE

## 2025-06-10 ENCOUNTER — ANESTHESIA EVENT (OUTPATIENT)
Dept: CARDIOLOGY | Facility: HOSPITAL | Age: 75
End: 2025-06-10
Payer: MEDICARE

## 2025-06-10 VITALS
HEART RATE: 60 BPM | HEIGHT: 63 IN | SYSTOLIC BLOOD PRESSURE: 124 MMHG | OXYGEN SATURATION: 95 % | WEIGHT: 267.64 LBS | TEMPERATURE: 97.2 F | RESPIRATION RATE: 18 BRPM | DIASTOLIC BLOOD PRESSURE: 55 MMHG | BODY MASS INDEX: 47.42 KG/M2

## 2025-06-10 DIAGNOSIS — I48.0 PAROXYSMAL ATRIAL FIBRILLATION (MULTI): ICD-10-CM

## 2025-06-10 DIAGNOSIS — I48.11 LONGSTANDING PERSISTENT ATRIAL FIBRILLATION (MULTI): ICD-10-CM

## 2025-06-10 LAB
ANION GAP SERPL CALCULATED.4IONS-SCNC: 13 MMOL/L (CALC) (ref 7–17)
BUN SERPL-MCNC: 9 MG/DL (ref 7–25)
BUN/CREAT SERPL: ABNORMAL (CALC) (ref 6–22)
CALCIUM SERPL-MCNC: 9.3 MG/DL (ref 8.6–10.4)
CHLORIDE SERPL-SCNC: 99 MMOL/L (ref 98–110)
CO2 SERPL-SCNC: 29 MMOL/L (ref 20–32)
CREAT SERPL-MCNC: 0.87 MG/DL (ref 0.6–1)
EGFRCR SERPLBLD CKD-EPI 2021: 69 ML/MIN/1.73M2
ERYTHROCYTE [DISTWIDTH] IN BLOOD BY AUTOMATED COUNT: 12.7 % (ref 11–15)
GLUCOSE SERPL-MCNC: 101 MG/DL (ref 65–99)
HCT VFR BLD AUTO: 46.1 % (ref 35–45)
HGB BLD-MCNC: 14.6 G/DL (ref 11.7–15.5)
INR PPP: 1
MCH RBC QN AUTO: 29.4 PG (ref 27–33)
MCHC RBC AUTO-ENTMCNC: 31.7 G/DL (ref 32–36)
MCV RBC AUTO: 92.9 FL (ref 80–100)
PLATELET # BLD AUTO: 258 THOUSAND/UL (ref 140–400)
PMV BLD REES-ECKER: 11.8 FL (ref 7.5–12.5)
POTASSIUM SERPL-SCNC: 4 MMOL/L (ref 3.5–5.3)
PROTHROMBIN TIME: 10.9 SEC (ref 9–11.5)
RBC # BLD AUTO: 4.96 MILLION/UL (ref 3.8–5.1)
SODIUM SERPL-SCNC: 141 MMOL/L (ref 135–146)
WBC # BLD AUTO: 9.8 THOUSAND/UL (ref 3.8–10.8)

## 2025-06-10 PROCEDURE — 93005 ELECTROCARDIOGRAM TRACING: CPT

## 2025-06-10 PROCEDURE — 93010 ELECTROCARDIOGRAM REPORT: CPT | Performed by: INTERNAL MEDICINE

## 2025-06-10 SDOH — HEALTH STABILITY: MENTAL HEALTH: CURRENT SMOKER: 0

## 2025-06-10 ASSESSMENT — PAIN - FUNCTIONAL ASSESSMENT: PAIN_FUNCTIONAL_ASSESSMENT: 0-10

## 2025-06-10 ASSESSMENT — PAIN SCALES - GENERAL: PAINLEVEL_OUTOF10: 0 - NO PAIN

## 2025-06-10 ASSESSMENT — COLUMBIA-SUICIDE SEVERITY RATING SCALE - C-SSRS
2. HAVE YOU ACTUALLY HAD ANY THOUGHTS OF KILLING YOURSELF?: NO
1. IN THE PAST MONTH, HAVE YOU WISHED YOU WERE DEAD OR WISHED YOU COULD GO TO SLEEP AND NOT WAKE UP?: NO
6. HAVE YOU EVER DONE ANYTHING, STARTED TO DO ANYTHING, OR PREPARED TO DO ANYTHING TO END YOUR LIFE?: NO

## 2025-06-10 NOTE — NURSING NOTE
Patient spoke to Dr. Araya and is able to go home.  Look on my chart for appt for holter monitor and keep medication and appts as before.  Patient left per wheelchair to spouse in car.

## 2025-06-10 NOTE — ANESTHESIA PREPROCEDURE EVALUATION
Megan Guerra is a 75 y.o. female here for:    Cardioversion External  With * No providers found *  Paroxysmal atrial fibrillation (Multi)  Longstanding persistent atrial fibrillation (Multi)    Relevant Problems   Cardiac  CONCLUSIONS:   1. Left ventricular systolic function is normal with a 60-65% estimated ejection fraction.   2. Currently normal diastolic function.   3. Normal RV size and function      RVSP mildly elevated at 38 mmHg consistent with mild pulmonary hypertension.   4. Normal valve function.     (+) Atrial fibrillation (Multi)   (+) Essential hypertension   (+) Hyperlipidemia   (+) Paroxysmal atrial fibrillation (Multi)   (+) Sinus node dysfunction (Multi)      Pulmonary   (+) Mild intermittent asthma without complication (HHS-HCC)   (+) Pulmonary hypertension (Multi)      Neuro   (+) Absence seizure (Multi)   (+) Depression   (+) Primary dysthymia      Endocrine   (+) Class 3 severe obesity due to excess calories with serious comorbidity and body mass index (BMI) of 45.0 to 49.9 in adult      Hematology   (+) Chronic anticoagulation      Musculoskeletal   (+) Primary osteoarthritis of left hip   (+) Primary osteoarthritis of left knee       Lab Results   Component Value Date    HGB 14.6 06/09/2025    HCT 46.1 (H) 06/09/2025    WBC 9.8 06/09/2025     06/09/2025     06/09/2025    K 4.0 06/09/2025    CL 99 06/09/2025    CREATININE 0.87 06/09/2025    BUN 9 06/09/2025       Tobacco Use History[1]    RX Allergies[2]    Current Outpatient Medications   Medication Instructions    acetaminophen (TYLENOL) 500 mg, Every 4 hours PRN    albuterol (ProAir HFA) 90 mcg/actuation inhaler 2 puffs, inhalation, Every 6 hours, As directed     apixaban (ELIQUIS) 5 mg, oral, 2 times daily    escitalopram (LEXAPRO) 20 mg, oral, Daily    fluorouracil (Efudex) 5 % cream cream APPLY A THIN LAYER TO AFFECTED AREA ON LEFT EYEBROW 1 TIME DAILY FOR 3 TO 4 WEEKS. ALSO USE ON OTHER SCALY AREAS    furosemide (Lasix) 40  mg tablet Take 1 1/2 (30 mg)  daily x 4 days then resume 1 tablet daily thereafter. May take additional 1/2 tablet as needed for swelling.    lamoTRIgine (LaMICtal) 100 mg tablet 1 tablet, 2 times daily    sotalol (BETAPACE) 240 mg, oral, 2 times daily       Surgical History[3]    Family History[4]    NPO Details:  NPO/Void Status  Date of Last Liquid: 06/09/25  Time of Last Liquid: 2300  Date of Last Solid: 06/09/25  Time of Last Solid: 2300  Time of Last Void: 0645        Physical Exam  Airway  Mallampati: III    Cardiovascular   Rhythm: irregular    Dental   Pulmonary   Neurological   Abdominal         Anesthesia Plan    History of general anesthesia?: yes  History of complications of general anesthesia?: no    ASA 3     MAC     The patient is not a current smoker.    intravenous induction   Anesthetic plan and risks discussed with patient.    Plan discussed with CRNA.             [1]   Social History  Tobacco Use   Smoking Status Former    Types: Cigarettes    Passive exposure: Past   Smokeless Tobacco Never   [2]   Allergies  Allergen Reactions    Metoprolol Other     Depression, agitation, irritability     Oxcarbazepine Other     Depression     Adhesive Tape-Silicones Rash    Gabapentin Diarrhea    Naproxen Diarrhea   [3]   Past Surgical History:  Procedure Laterality Date    OTHER SURGICAL HISTORY  02/14/2022    Colonoscopy    OTHER SURGICAL HISTORY  11/25/2019    Hip replacement    OTHER SURGICAL HISTORY  11/25/2019    Knee replacement   [4]   Family History  Problem Relation Name Age of Onset    Diabetes Mother      Other (cardiac disorder) Father      Coronary artery disease Father      Esophageal cancer Brother x2     Other (Meningitis) Brother x2

## 2025-06-10 NOTE — PROGRESS NOTES
Patient noted to be sinus bradycardia on EKG on admit.  EKG shows sinus bradycardia with heart rate 57 bpm and QTc 486 ms.  Cardioversion canceled.  Patient seen by Dr. Lenore Zimmerman to continue on current medications.  48-hour Holter monitor in the next 2 weeks, then follow-up with [facility] as previously scheduled in July.  Elsie to discharge home.

## 2025-06-13 LAB
ATRIAL RATE: 54 BPM
ATRIAL RATE: 57 BPM
P OFFSET: 175 MS
P ONSET: 122 MS
Q ONSET: 215 MS
Q ONSET: 220 MS
QRS COUNT: 9 BEATS
QRS COUNT: 9 BEATS
QRS DURATION: 86 MS
QRS DURATION: 90 MS
QT INTERVAL: 500 MS
QT INTERVAL: 504 MS
QTC CALCULATION(BAZETT): 477 MS
QTC CALCULATION(BAZETT): 486 MS
QTC FREDERICIA: 486 MS
QTC FREDERICIA: 491 MS
R AXIS: 70 DEGREES
R AXIS: 70 DEGREES
T AXIS: 71 DEGREES
T AXIS: 76 DEGREES
T OFFSET: 465 MS
T OFFSET: 472 MS
VENTRICULAR RATE: 54 BPM
VENTRICULAR RATE: 57 BPM

## 2025-06-17 ENCOUNTER — APPOINTMENT (OUTPATIENT)
Dept: CARDIOLOGY | Facility: CLINIC | Age: 75
End: 2025-06-17
Payer: MEDICARE

## 2025-06-17 DIAGNOSIS — I48.11 LONGSTANDING PERSISTENT ATRIAL FIBRILLATION (MULTI): ICD-10-CM

## 2025-06-25 PROCEDURE — 93227 XTRNL ECG REC<48 HR R&I: CPT | Performed by: INTERNAL MEDICINE

## 2025-07-14 ENCOUNTER — APPOINTMENT (OUTPATIENT)
Dept: CARDIOLOGY | Facility: CLINIC | Age: 75
End: 2025-07-14
Payer: MEDICARE

## 2025-07-14 VITALS
SYSTOLIC BLOOD PRESSURE: 122 MMHG | WEIGHT: 263 LBS | HEART RATE: 58 BPM | BODY MASS INDEX: 46.6 KG/M2 | DIASTOLIC BLOOD PRESSURE: 74 MMHG | HEIGHT: 63 IN

## 2025-07-14 DIAGNOSIS — R00.1 BRADYCARDIA, UNSPECIFIED: ICD-10-CM

## 2025-07-14 DIAGNOSIS — Z79.01 CHRONIC ANTICOAGULATION: Primary | ICD-10-CM

## 2025-07-14 DIAGNOSIS — I48.11 LONGSTANDING PERSISTENT ATRIAL FIBRILLATION (MULTI): ICD-10-CM

## 2025-07-14 DIAGNOSIS — Z79.899 HIGH RISK MEDICATION USE: ICD-10-CM

## 2025-07-14 DIAGNOSIS — R00.2 PALPITATIONS: ICD-10-CM

## 2025-07-14 PROCEDURE — 3078F DIAST BP <80 MM HG: CPT | Performed by: NURSE PRACTITIONER

## 2025-07-14 PROCEDURE — 3074F SYST BP LT 130 MM HG: CPT | Performed by: NURSE PRACTITIONER

## 2025-07-14 PROCEDURE — 1159F MED LIST DOCD IN RCRD: CPT | Performed by: NURSE PRACTITIONER

## 2025-07-14 PROCEDURE — 1036F TOBACCO NON-USER: CPT | Performed by: NURSE PRACTITIONER

## 2025-07-14 PROCEDURE — 99214 OFFICE O/P EST MOD 30 MIN: CPT | Performed by: NURSE PRACTITIONER

## 2025-07-14 PROCEDURE — 1160F RVW MEDS BY RX/DR IN RCRD: CPT | Performed by: NURSE PRACTITIONER

## 2025-07-14 NOTE — PROGRESS NOTES
"Chief Complaint:  Chief Complaint   Patient presents with    Follow-up     \"Check up\"         Megan Guerra is a 75 y.o. female that presents to the office today for cardiac follow-up/test results.  She follows with Dr. Araya and was added to my schedule today.  She  has a PMH of persistent atrial fibrillation, high risk medications nadolol, long-term anticoagulation, asymptomatic bradycardia, shortness of breath, hyperlipidemia.    She was last seen in office with Dr. Araya 6/2/2025 where her EKG showed atrial fibrillation.  She was scheduled for a direct current cardioversion 6/10/2025 at [facility] with Dr. Araya.  EKG at that time showed sinus bradycardia with heart rate 57 bpm and QTc 486 ms. Cardioversion canceled.   Outpatient 48 hr monitor was ordered at that time.  Results of event monitor were reviewed and discussed as noted below.    States she is doing well.  She denies any chest pain, chest pressure, chest tightness, shortness of breath.  She reports rare occasional palpitations.      Testing Reviewed  6/17/2025 48 hr monitor  This is a Holter monitor for 48 hours.  The underlying rhythm was sinus rhythm with PACs.  Sinus rhythm was the main rhythm during this study  The minimum heart rate was 43 bpm, the maximal heart rate was 83 bpm with average heart rate of 58 bpm.  There were occasional isolated PACs and PVCs but no any sustained atrial or ventricular arrhythmias.  No significant pauses were evidence of high-grade AV block were seen during the study.  Patient did not report symptoms during this study.     Conclusion  Underlying rhythm was sinus rhythm.  No significant or sustained atrial or ventricular arrhythmias seen during this study      CBC:   Lab Results   Component Value Date    WBC 9.8 06/09/2025    RBC 4.96 06/09/2025    HGB 14.6 06/09/2025    HCT 46.1 (H) 06/09/2025     06/09/2025        CMP:    Lab Results   Component Value Date     06/09/2025    K 4.0 06/09/2025    CL 99 06/09/2025 "    CO2 29 06/09/2025    BUN 9 06/09/2025    CREATININE 0.87 06/09/2025    GLUCOSE 101 (H) 06/09/2025    CALCIUM 9.3 06/09/2025       Lipid Profile:    Lab Results   Component Value Date    TRIG 124 11/10/2023    HDL 42.7 11/10/2023    LDLCALC 83 11/10/2023       BMP:  Lab Results   Component Value Date     06/09/2025     11/10/2023     09/12/2022     06/14/2021     01/29/2021    K 4.0 06/09/2025    K 4.0 11/10/2023    K 4.0 09/12/2022    K 3.9 06/14/2021    K 4.0 01/29/2021    CL 99 06/09/2025     11/10/2023     09/12/2022     06/14/2021     01/29/2021    CO2 29 06/09/2025    CO2 28 11/10/2023    CO2 29 09/12/2022    CO2 32 06/14/2021    CO2 32 01/29/2021    BUN 9 06/09/2025    BUN 12 11/10/2023    BUN 11 09/12/2022    BUN 15 06/14/2021    BUN 16 01/29/2021    CREATININE 0.87 06/09/2025    CREATININE 0.86 11/10/2023    CREATININE 0.97 09/12/2022    CREATININE 0.94 06/14/2021    CREATININE 0.98 01/29/2021       CBC:  Lab Results   Component Value Date    WBC 9.8 06/09/2025    WBC 10.3 11/10/2023    WBC 8.4 09/12/2022    WBC 9.8 06/14/2021    WBC 9.3 01/29/2021    RBC 4.96 06/09/2025    RBC 5.15 11/10/2023    RBC 5.20 09/12/2022    RBC 5.15 06/14/2021    RBC 5.41 (H) 01/29/2021    HGB 14.6 06/09/2025    HGB 15.4 11/10/2023    HGB 15.2 09/12/2022    HGB 15.1 06/14/2021    HGB 15.9 01/29/2021    HCT 46.1 (H) 06/09/2025    HCT 47.1 (H) 11/10/2023    HCT 48.8 (H) 09/12/2022    HCT 48.0 (H) 06/14/2021    HCT 50.4 (H) 01/29/2021    MCV 92.9 06/09/2025    MCV 92 11/10/2023    MCV 94 09/12/2022    MCV 93 06/14/2021    MCV 93 01/29/2021    MCH 29.4 06/09/2025    MCH 29.9 11/10/2023    MCHC 31.7 (L) 06/09/2025    MCHC 32.7 11/10/2023    MCHC 31.1 (L) 09/12/2022    MCHC 31.5 (L) 06/14/2021    MCHC 31.5 (L) 01/29/2021    RDW 12.7 06/09/2025    RDW 13.2 11/10/2023    RDW 14.2 09/12/2022    RDW 13.1 06/14/2021    RDW 13.1 01/29/2021     06/09/2025     11/10/2023     " 09/12/2022     06/14/2021     01/29/2021    MPV 11.8 06/09/2025       Hepatic Function Panel:    Lab Results   Component Value Date    ALKPHOS 59 11/10/2023    ALT 23 11/10/2023    AST 24 11/10/2023    PROT 6.3 (L) 11/10/2023    BILITOT 0.8 11/10/2023       HgBA1c:    Lab Results   Component Value Date    HGBA1C 5.8 05/27/2025       Magnesium:    Lab Results   Component Value Date    MG 1.94 11/10/2023       TSH:    Lab Results   Component Value Date    TSH 1.63 11/10/2023       BNP:   Lab Results   Component Value Date    BNP 83 04/10/2019        PT/INR:    Lab Results   Component Value Date    PROTIME 10.9 06/09/2025    INR 1.0 06/09/2025       Problem List[1]    Social History[2]    Medical History[3]    Current Medications[4]    Metoprolol, Oxcarbazepine, Adhesive tape-silicones, Gabapentin, and Naproxen    Family History[5]    Surgical History[6]       Review of systems  Constitutional: No weight loss, fever, chills, weakness or fatigue  HEENT: No visual loss, blurred vision, double vision or yellow sclerae  Skin: No rash or itching  Cardiovascular: No chest pain, pressure or discomfort.  Occasional palpitations.  Respiratory: No shortness of breath, cough or sputum  Gastrointestinal: No nausea, vomiting or diarrhea. No bloody or dark tarry stools.  Neurological: No headache, lightheadedness, dizziness, syncope.   Musculoskeletal: No muscle, back pain, joint pain or stiffness.  Hematologic: No anemia, bleeding or bruising.    /74 (BP Location: Right arm, Patient Position: Sitting)   Pulse 58   Ht 1.6 m (5' 3\")   Wt 119 kg (263 lb)   BMI 46.59 kg/m²     Physical Exam  Constitutional: Well developed, awake/alert x 3, no distress.  Head/Neck: No JVD, No bruits  Respiratory/Thorax: patent airways, CTAB, normal breath sounds with good expansion.  Cardiovascular: Regular rate and rhythm, no murmurs, normal S1 and S2,   Gastrointestinal: Non distended, soft, non-tender, no rebound " tenderness or guarding.  Extremities: No cyanosis, edema.   Neurological: Alert and oriented x 3. Moves extremities spontaneous with purpose.  Psychological: Appropriate mood and behavior  Skin: Warm and Dry. No lesions or rashes.     Assessment/Plan  Persistent atrial fibrillation; maintaining sinus rhythm.  High risk medication; sotalol.  Long-term anticoagulation; denies bleeding, reports compliance.  Continue Eliquis  Palpitations; reports rare occasions  Bradycardia; asymptomatic.  Follow-up in the office with Dr. Araya in 6 months or sooner if needed.      Please excuse any errors in grammar or translation related to dictation, voice recognition software was used to prepare this document.           [1]   Patient Active Problem List  Diagnosis    Abnormal results of cardiovascular function studies    Absence seizure (Multi)    Actinic keratosis    Arthralgia of multiple sites    Arthropathy of multiple sites    Chronic back pain    Contusion of scalp    Depression    Essential hypertension    Hemangioma of skin and subcutaneous tissue    Hyperlipidemia    Lumbar sprain    Melanocytic nevi of trunk    Other melanin hyperpigmentation    Palpitations    Atrial fibrillation (Multi)    Paroxysmal atrial fibrillation (Multi)    Pedal edema    Primary dysthymia    Primary osteoarthritis of left hip    Primary osteoarthritis of left knee    Pulmonary hypertension (Multi)    Dyspnea    Mild intermittent asthma without complication (HHS-HCC)    Sinus node dysfunction (Multi)    Spondylolisthesis of lumbosacral region    Stage 2 chronic kidney disease    Vitamin D deficiency    Former smoker    High risk medication use    Class 3 severe obesity due to excess calories with serious comorbidity and body mass index (BMI) of 45.0 to 49.9 in adult    Chronic anticoagulation    Bradycardia, unspecified   [2]   Social History  Tobacco Use    Smoking status: Former     Types: Cigarettes     Passive exposure: Past    Smokeless  tobacco: Never   Vaping Use    Vaping status: Never Used   Substance Use Topics    Alcohol use: Yes     Alcohol/week: 7.0 standard drinks of alcohol     Types: 7 Standard drinks or equivalent per week    Drug use: Never   [3]   Past Medical History:  Diagnosis Date    A-fib (Multi)     Arrhythmia     Cancer (Multi)     Elevated blood-pressure reading, without diagnosis of hypertension     Elevated blood pressure reading    Encounter for other preprocedural examination     Preop examination    Encounter for other screening for malignant neoplasm of breast     Screening for breast cancer    Encounter for screening for malignant neoplasm of colon 06/17/2020    Colon cancer screening    Former smoker     Hypertension     Personal history of other diseases of the musculoskeletal system and connective tissue     History of arthritis    Personal history of other diseases of the musculoskeletal system and connective tissue     History of back pain    Personal history of other diseases of the respiratory system     History of bronchitis    Personal history of other medical treatment     H/O mammogram    Personal history of other medical treatment     History of screening mammography    Personal history of other mental and behavioral disorders 06/21/2021    History of depression    Personal history of other specified conditions     History of fatigue    Rash and other nonspecific skin eruption     Skin rash   [4]   Current Outpatient Medications:     acetaminophen (Tylenol) 500 mg tablet, Take 1 tablet (500 mg) by mouth every 4 hours if needed., Disp: , Rfl:     albuterol (ProAir HFA) 90 mcg/actuation inhaler, Inhale 2 puffs every 6 hours. As directed, Disp: 18 g, Rfl: 3    apixaban (Eliquis) 5 mg tablet, Take 1 tablet (5 mg) by mouth 2 times a day., Disp: 180 tablet, Rfl: 1    fluorouracil (Efudex) 5 % cream cream, APPLY A THIN LAYER TO AFFECTED AREA ON LEFT EYEBROW 1 TIME DAILY FOR 3 TO 4 WEEKS. ALSO USE ON OTHER SCALY  AREAS, Disp: , Rfl:     furosemide (Lasix) 40 mg tablet, TAKE ONE TABLET BY MOUTH EVERY DAY. MAY TAKE ADDITIONAL 1/2 TABLET(20MG) AS NEEDED FOR SWELLING, Disp: 135 tablet, Rfl: 0    lamoTRIgine (LaMICtal) 100 mg tablet, Take 1 tablet (100 mg) by mouth 2 times a day., Disp: , Rfl:     sotalol (Betapace) 240 mg tablet, Take 1 tablet (240 mg) by mouth 2 times a day., Disp: 180 tablet, Rfl: 1  [5]   Family History  Problem Relation Name Age of Onset    Diabetes Mother      Other (cardiac disorder) Father      Coronary artery disease Father      Esophageal cancer Brother x2     Other (Meningitis) Brother x2    [6]   Past Surgical History:  Procedure Laterality Date    OTHER SURGICAL HISTORY  02/14/2022    Colonoscopy    OTHER SURGICAL HISTORY  11/25/2019    Hip replacement    OTHER SURGICAL HISTORY  11/25/2019    Knee replacement

## 2025-07-22 RX ORDER — ESCITALOPRAM OXALATE 10 MG/1
10 TABLET ORAL DAILY
Qty: 30 TABLET | Refills: 0 | Status: SHIPPED | OUTPATIENT
Start: 2025-07-22

## 2025-08-19 DIAGNOSIS — M17.12 PRIMARY OSTEOARTHRITIS OF LEFT KNEE: ICD-10-CM

## 2025-08-19 DIAGNOSIS — M43.17 SPONDYLOLISTHESIS OF LUMBOSACRAL REGION: Primary | ICD-10-CM

## 2025-08-24 RX ORDER — ESCITALOPRAM OXALATE 10 MG/1
10 TABLET ORAL DAILY
Qty: 30 TABLET | Refills: 3 | Status: SHIPPED | OUTPATIENT
Start: 2025-08-24

## 2026-02-02 ENCOUNTER — APPOINTMENT (OUTPATIENT)
Dept: CARDIOLOGY | Facility: CLINIC | Age: 76
End: 2026-02-02
Payer: MEDICARE